# Patient Record
Sex: FEMALE | Race: WHITE | NOT HISPANIC OR LATINO | Employment: FULL TIME | URBAN - METROPOLITAN AREA
[De-identification: names, ages, dates, MRNs, and addresses within clinical notes are randomized per-mention and may not be internally consistent; named-entity substitution may affect disease eponyms.]

---

## 2017-01-04 ENCOUNTER — ALLSCRIPTS OFFICE VISIT (OUTPATIENT)
Dept: OTHER | Facility: OTHER | Age: 27
End: 2017-01-04

## 2017-02-17 LAB
GLUCOSE 1H P GLC SERPL-MCNC: 176 MG/DL
GLUCOSE P FAST SERPL-MCNC: 75 MG/DL

## 2017-05-11 ENCOUNTER — ALLSCRIPTS OFFICE VISIT (OUTPATIENT)
Dept: OTHER | Facility: OTHER | Age: 27
End: 2017-05-11

## 2017-08-24 ENCOUNTER — ALLSCRIPTS OFFICE VISIT (OUTPATIENT)
Dept: OTHER | Facility: OTHER | Age: 27
End: 2017-08-24

## 2017-08-25 ENCOUNTER — GENERIC CONVERSION - ENCOUNTER (OUTPATIENT)
Dept: OTHER | Facility: OTHER | Age: 27
End: 2017-08-25

## 2017-11-29 ENCOUNTER — ALLSCRIPTS OFFICE VISIT (OUTPATIENT)
Dept: OTHER | Facility: OTHER | Age: 27
End: 2017-11-29

## 2017-12-23 LAB
EXTERNAL ABO GROUPING: NORMAL
EXTERNAL ANTIBODY SCREEN: NORMAL
EXTERNAL CHLAMYDIA SCREEN: NOT DETECTED
EXTERNAL GONORRHEA SCREEN: NOT DETECTED
EXTERNAL HEPATITIS B SURFACE ANTIGEN: NORMAL
EXTERNAL HIV-1 ANTIBODY: NORMAL
EXTERNAL RH FACTOR: POSITIVE
EXTERNAL RUBELLA IGG QUANTITATION: NORMAL
EXTERNAL SYPHILIS RPR SCREEN: NORMAL

## 2017-12-28 ENCOUNTER — ALLSCRIPTS OFFICE VISIT (OUTPATIENT)
Dept: OTHER | Facility: OTHER | Age: 27
End: 2017-12-28

## 2018-01-13 NOTE — PSYCH
Psych Med Mgmt    Appearance: was calm and cooperative  Observed mood: mood appropriate  Observed mood: affect appropriate  Speech: a normal rate  Hallucinations: no hallucinations present  Thought Content: no delusions  Treatment Recommendations: Seroquel 100 mg po qhs   Lexapro 10 mg po qd    Discussed possibility of reducing seroquel to 75 mg at next visit if stable    Pt on birth control- no plans of pregnancy in near future  Risks, Benefits And Possible Side Effects Of Medications: Risks, benefits, and possible side effects of medications explained to patient and patient verbalizes understanding, Risks of medications explained if female patient  Patient verbalizes understanding and agrees to notify her doctor if she becomes pregnant  She reports normal appetite, normal energy level, no weight change and normal number of sleep hours  pt seen for follow up OCD  Pt states she has been feeling "pretty good"  Her son is now 17 months old  He is doing well- she is no longer fearful of spending time with him alone  Sleeping about 7-8 hours at night  No panic attacks- no nightmares  Work has been stable  Appetite is good  She is going to [place] Sunday  She has some anxiety about the plane ride but otherwise stable  Discussed residual anxiety  She reports good interest and motivation  Vitals  Signs [Data Includes: Current Encounter]   Recorded: [id number] 04:23PM   Heart Rate: 79  Respiration: 16  Systolic: 876  Diastolic: 77  Height: 5 ft 8 in  Weight: 165 lb   BMI Calculated: 25 09  BSA Calculated: 1 88  Recorded: 91EHC0298 04:22PM   Respiration: 16  Height: 5 ft 7 in    DSM    Provisional Diagnosis: OCD  Anxiety with panic  Assessment    1  OCD (obsessive compulsive disorder) (300 3) (F42)   2  Generalized anxiety disorder (300 02) (F41 1)    Plan    1  Escitalopram Oxalate 10 MG Oral Tablet; 1  tab po qd    2   QUEtiapine Fumarate 100 MG Oral Tablet (SEROquel); 1 po qhs    Review of Systems    Constitutional: No fever, no chills, feels well, no tiredness, no recent weight gain or loss  Cardiovascular: no complaints of slow or fast heart rate, no chest pain, no palpitations  Respiratory: no complaints of shortness of breath, no wheezing, no dyspnea on exertion  Gastrointestinal: no complaints of abdominal pain, no constipation, no nausea, no diarrhea, no vomiting  Genitourinary: no complaints of dysuria, no incontinence, no pelvic pain, no urinary frequency  Musculoskeletal: no complaints of arthralgia, no myalgias, no limb pain, no joint stiffness  Integumentary: no complaints of skin rash, no itching, no dry skin  Neurological: no complaints of headache, no confusion, no numbness, no dizziness  Active Problems    1  Generalized anxiety disorder (300 02) (F41 1)   2  OCD (obsessive compulsive disorder) (300 3) (F42)    Past Medical History    The active problems and past medical history were reviewed and updated today  Allergies    1  Sulfa Drugs    Current Meds   1  Escitalopram Oxalate 10 MG Oral Tablet; 1  tab po qd; Therapy: 04HMQ6410 to (Last Rx:30Mar2016)  Requested for: 43BKI5925 Ordered   2  QUEtiapine Fumarate 100 MG Oral Tablet; 1 po qhs;   Therapy: 20Cdj5899 to (Last Rx:30Mar2016)  Requested for: 48ZQE2798 Ordered   3  Zovia 1-35 MG-MCG TABS; Therapy: (Recorded:24Jun2016) to Recorded    The medication list was reviewed and updated today  Family Psych History  Mother    1  Family history of Anxiety    The family history was reviewed and updated today  Social History    · Never a smoker   · Social alcohol use (Z78 9)  The social history was reviewed and updated today  The social history was reviewed and is unchanged  End of Encounter Meds    1  Escitalopram Oxalate 10 MG Oral Tablet; 1  tab po qd; Therapy: 37OLH4444 to (Last Rx:24Jun2016)  Requested for: 24Jun2016 Ordered    2   QUEtiapine Fumarate 100 MG Oral Tablet (SEROquel); 1 po qhs;   Therapy: 08Odh1303 to (Last Rx:24Jun2016)  Requested for: 82CVW5004 Ordered    3  Zovia 1-35 MG-MCG TABS;    Therapy: (Recorded:24Jun2016) to Recorded    Signatures   Electronically signed by : Isaías Rodrigez, [facility]; Jun 24 2016  4:27PM EST                       (Author)    Electronically signed by : Latonia Valencia MD; Jun 27 2016  3:46PM EST

## 2018-01-14 NOTE — PSYCH
Psych Med Mgmt    Appearance: was calm and cooperative  Observed mood: anxious  Observed mood: affect appropriate  Speech: a normal rate  Thought processes: coherent/organized  Hallucinations: no hallucinations present  Thought Content: no delusions  Abnormal Thoughts: The patient has no suicidal thoughts and no homicidal thoughts  Orientation: The patient is oriented to person, place and time  Recent and Remote Memory: short term memory intact and long term memory intact  Attention Span And Concentration: concentration intact  Insight: Insight intact  Judgment: Her judgment was intact  Muscle Strength And Tone  Normal gait and station  Treatment Recommendations: Continue Seroquel 100 mg qhs-  will try to reduce seroquel in June  Lexapro 10 mg po qd  Risks, Benefits And Possible Side Effects Of Medications: Risks, benefits, and possible side effects of medications explained to patient and patient verbalizes understanding  She reports normal appetite, normal energy level, no weight change and normal number of sleep hours  Pt states she doing better and being alone with Judith Chapman is okay- her anxiety is manageable  She states she knows she will always have some anxiety but feels as though she can mange it  Sleeping well - about 8-9 hours  Appetite good po  Work has been busy  Vitals  Signs [Data Includes: Current Encounter]   Recorded: [id number] 04:31PM   Height: 5 ft 7 in  Weight: 165 lb   BMI Calculated: 25 84  BSA Calculated: 1 86    DSM    Provisional Diagnosis: OCD  ROBINA  Assessment    1  OCD (obsessive compulsive disorder) (300 3) (F42)   2  Generalized anxiety disorder (300 02) (F41 1)    Plan    1  Escitalopram Oxalate 10 MG Oral Tablet; 1  tab po qd    2  QUEtiapine Fumarate 100 MG Oral Tablet (SEROquel); 1 po qhs    Review of Systems    Constitutional: No fever, no chills, feels well, no tiredness, no recent weight gain or loss     Cardiovascular: no complaints of slow or fast heart rate, no chest pain, no palpitations  Respiratory: no complaints of shortness of breath, no wheezing, no dyspnea on exertion  Gastrointestinal: no complaints of abdominal pain, no constipation, no nausea, no diarrhea, no vomiting  Genitourinary: no complaints of dysuria, no incontinence, no pelvic pain, no urinary frequency  Musculoskeletal: no complaints of arthralgia, no myalgias, no limb pain, no joint stiffness  Integumentary: no complaints of skin rash, no itching, no dry skin  Neurological: no complaints of headache, no confusion, no numbness, no dizziness  Active Problems    1  Generalized anxiety disorder (300 02) (F41 1)   2  OCD (obsessive compulsive disorder) (300 3) (F42)    Allergies    1  Sulfa Drugs    Current Meds   1  Escitalopram Oxalate 10 MG Oral Tablet; 1  tab po qd; Therapy: 52SFX2711 to (Last Rx:47Yje2979)  Requested for: 05Ohz2845 Ordered   2  QUEtiapine Fumarate 100 MG Oral Tablet; 1 po qhs;   Therapy: 56Jok4892 to (Last Rx:94Nky4956)  Requested for: 49Vgf6978 Ordered    The medication list was reviewed and updated today  Family Psych History    1  Family history of Anxiety    The family history was reviewed and updated today  Social History    · Never a smoker   · Social alcohol use (Z78 9)  The social history was reviewed and updated today  The social history was reviewed and is unchanged  End of Encounter Meds    1  Escitalopram Oxalate 10 MG Oral Tablet; 1  tab po qd; Therapy: 94WKL1181 to (Last Rx:30Mar2016)  Requested for: 48YUX1598 Ordered    2   QUEtiapine Fumarate 100 MG Oral Tablet (SEROquel); 1 po qhs;   Therapy: 54Rzt1355 to (Last Rx:30Mar2016)  Requested for: 11PEP1389 Ordered    Signatures   Electronically signed by : Eve White [facility]; Mar 30 2016  4:46PM EST                       (Author)    Electronically signed by : Sharon Verduzco MD; Apr 4 2016  5:15PM EST

## 2018-01-15 NOTE — PSYCH
Psych Med Mgmt    Appearance: was calm and cooperative, adequate hygiene and grooming and good eye contact  Observed mood: mood appropriate  Observed mood: affect appropriate  Speech: a normal rate  Thought processes: coherent/organized  Hallucinations: no hallucinations present  Thought Content: no delusions  Abnormal Thoughts: The patient has no suicidal thoughts and no homicidal thoughts  Orientation: The patient is oriented to person, place and time  Recent and Remote Memory: short term memory intact and long term memory intact  Attention Span And Concentration: concentration intact  Insight: Insight intact  Judgment: Her judgment was intact  Muscle Strength And Tone  Normal gait and station  Treatment Recommendations: Lexapro 10 mg po qd  Seroquel 25 mg x 14 nights then 1/2 po qhs  Risks, Benefits And Possible Side Effects Of Medications: Risks, benefits, and possible side effects of medications explained to patient and patient verbalizes understanding  Discussed with patient the risks of sedation, respiratory depression, impairment of ability to drive and potential for abuse and addiction related to treatment with benzodiazepine medications  The patient understands risk of treatment with benzodiazepine medications, agrees to not drive if feels impaired and agrees to take medications as prescribed  She reports normal appetite, normal energy level, no weight change and normal number of sleep hours  Pt seen for follow up OCD/ ROBINA  Pt states initially when she reduced the seroquel she felt "a little edgy"  After a few days she felt okay though and has been doing well  She has not had any panic attacks and reports she has "normal anxiety" and is generally in good spirits  Good interest and motivation She states she sleeps well through the night  Appetite is good but she has gained a few pounds because not exercising as much due to tie constraints    No SI and doing well with her moods  No adverse effects with meds  No new meds or medical issues  Vitals  Signs   Recorded: 24Aug2017 04:27PM   Respiration: 1  Height: 5 ft 8 in  Weight: 190 lb   BMI Calculated: 28 89  BSA Calculated: 2    DSM    Provisional Diagnosis: ROBINA  OCD  Assessment    1  Generalized anxiety disorder (300 02) (F41 1)   2  OCD (obsessive compulsive disorder) (300 3) (F42 9)    Plan    1  Escitalopram Oxalate 10 MG Oral Tablet; take 1 tablet by mouth every day    2  QUEtiapine Fumarate 25 MG Oral Tablet; 1 po qhs    Review of Systems    Constitutional: No fever, no chills, feels well, no tiredness, no recent weight gain or loss  Cardiovascular: no complaints of slow or fast heart rate, no chest pain, no palpitations  Respiratory: no complaints of shortness of breath, no wheezing, no dyspnea on exertion  Gastrointestinal: no complaints of abdominal pain, no constipation, no nausea, no diarrhea, no vomiting  Genitourinary: no complaints of dysuria, no incontinence, no pelvic pain, no urinary frequency  Musculoskeletal: no complaints of arthralgia, no myalgias, no limb pain, no joint stiffness  Integumentary: no complaints of skin rash, no itching, no dry skin  Neurological: no complaints of headache, no confusion, no numbness, no dizziness  Active Problems    1  Generalized anxiety disorder (300 02) (F41 1)   2  OCD (obsessive compulsive disorder) (300 3) (F42 9)    Allergies    1  Sulfa Drugs    Current Meds   1  Escitalopram Oxalate 10 MG Oral Tablet; take 1 tablet by mouth every day; Therapy: 07UAR8393 to (Evaluate:17Rpe0564)  Requested for: 73BQF5375; Last   Rx:41Pdt6588 Ordered   2  QUEtiapine Fumarate 50 MG Oral Tablet; Take 1 and 1/2 po qhs x 14 days then 1 po qhs;   Therapy: 78Qjn5542 to (Last Rx:29Cyt9754)  Requested for: 04MAJ1653 Ordered   3  Zovia 1-35 MG-MCG TABS;    Therapy: (Recorded:24Jun2016) to Recorded    The medication list was reviewed and updated today  Family Psych History  Mother    1  Family history of Anxiety    Social History    · Never a smoker   · Social alcohol use (Z78 9)  The social history was reviewed and is unchanged  End of Encounter Meds    1  Escitalopram Oxalate 10 MG Oral Tablet; take 1 tablet by mouth every day; Therapy: 04EWC5661 to (Evaluate:05Iov9596)  Requested for: 75Onu1003; Last   Rx:06Yez3605 Ordered    2  QUEtiapine Fumarate 25 MG Oral Tablet; 1 po qhs;   Therapy: 91FHA8287 to (Last Rx:51Sbe9527)  Requested for: 69Hgh0644 Ordered    3  Zovia 1-35 MG-MCG TABS; Therapy: (Miracle Cousins) to Recorded    Signatures   Electronically signed by : Aviva White, [facility];  Aug 24 2017  4:28PM EST                       (Author)    Electronically signed by : Shaquille Pool MD; Aug 24 2017  4:41PM EST

## 2018-01-15 NOTE — PSYCH
Psych Med Mgmt    Appearance: was calm and cooperative  Observed mood: mood appropriate  Observed mood: affect appropriate  Speech: a normal rate  Thought processes: coherent/organized  Hallucinations: no hallucinations present  Thought Content: no delusions  Abnormal Thoughts: The patient has no suicidal thoughts and no homicidal thoughts  Orientation: The patient is oriented to person, place and time  Recent and Remote Memory: short term memory intact and long term memory intact  Attention Span And Concentration: concentration intact  Insight: Insight intact  Judgment: Her judgment was intact  Muscle Strength And Tone  Normal gait and station  Treatment Recommendations: Decrease Seroquel to 75 mg po qhs x 14 days then 1po qhs  Lexapro 10 mg po qd  Risks, Benefits And Possible Side Effects Of Medications: Risks, benefits, and possible side effects of medications explained to patient and patient verbalizes understanding  Discussed with patient the risks of sedation, respiratory depression, impairment of ability to drive and potential for abuse and addiction related to treatment with benzodiazepine medications  The patient understands risk of treatment with benzodiazepine medications, agrees to not drive if feels impaired and agrees to take medications as prescribed  She reports normal appetite, normal energy level, no weight change and normal number of sleep hours  Pt seen for follow up ROBINA/ OCD  Pt states she is feeling well  Pt overall states anxiety is manageable and no panic attacks  Residual anxiety and racing thoughts but manageable  Pt without depressive episodes  Sleeping well and eating well  Alex Souza is now 2 and doing well  Her anxiety re: baby is much improved  No side effects and interested in taper of seroquel which is reasonable at this point  No new meds or medical issues        Vitals  Signs   Recorded: 95UHW3360 04:21PM   Heart Rate: 85  Systolic: 406  Diastolic: 69  Recorded: 90TWU9100 04:05PM   Height: 5 ft 8 in  Weight: 180 lb   BMI Calculated: 27 37  BSA Calculated: 1 95    DSM    Provisional Diagnosis: ROBINA  OCD  Assessment    1  Generalized anxiety disorder (300 02) (F41 1)   2  OCD (obsessive compulsive disorder) (300 3) (F42 9)    Plan    1  Escitalopram Oxalate 10 MG Oral Tablet; take 1 tablet by mouth every day    2  QUEtiapine Fumarate 50 MG Oral Tablet; Take 1 and 1/2 po qhs x 14 days then 1   po qhs    Review of Systems    Constitutional: No fever, no chills, feels well, no tiredness, no recent weight gain or loss  Cardiovascular: no complaints of slow or fast heart rate, no chest pain, no palpitations  Respiratory: no complaints of shortness of breath, no wheezing, no dyspnea on exertion  Gastrointestinal: no complaints of abdominal pain, no constipation, no nausea, no diarrhea, no vomiting  Genitourinary: no complaints of dysuria, no incontinence, no pelvic pain, no urinary frequency  Musculoskeletal: no complaints of arthralgia, no myalgias, no limb pain, no joint stiffness  Integumentary: no complaints of skin rash, no itching, no dry skin  Neurological: no complaints of headache, no confusion, no numbness, no dizziness  Active Problems    1  Generalized anxiety disorder (300 02) (F41 1)   2  OCD (obsessive compulsive disorder) (300 3) (F42 9)    Allergies    1  Sulfa Drugs    Current Meds   1  Escitalopram Oxalate 10 MG Oral Tablet; take 1 tablet by mouth every day; Therapy: 47BBB9205 to (Ayaka Face)  Requested for: 63MJA7358; Last   SL:52XVF1823 Ordered   2  QUEtiapine Fumarate 100 MG Oral Tablet; Take 1 tablet by mouth at bedtime; Therapy: 10Ofm9915 to (Kelsey Hernandez)  Requested for: 02AMK2804; Last   Rx:04Jan2017 Ordered   3  Zovia 1-35 MG-MCG TABS; Therapy: (Recorded:24Jun2016) to Recorded    The medication list was reviewed and updated today         Family Psych History  Mother 1  Family history of Anxiety    The family history was reviewed and updated today  Social History    · Never a smoker   · Social alcohol use (Z78 9)    End of Encounter Meds    1  Escitalopram Oxalate 10 MG Oral Tablet; take 1 tablet by mouth every day; Therapy: 54FQP2290 to (Evaluate:35Pov9609)  Requested for: 42MSM6720; Last   Rx:71Vrf0863 Ordered    2  QUEtiapine Fumarate 50 MG Oral Tablet; Take 1 and 1/2 po qhs x 14 days then 1 po qhs;   Therapy: 53Zjq7183 to (Last Rx:11May2017)  Requested for: 11DEG3169 Ordered    3  Zovia 1-35 MG-MCG TABS;    Therapy: (Recorded:15Xhf2128) to Recorded    Signatures   Electronically signed by : Annetta Cheek, [facility]; May 11 2017  4:24PM EST                       (Author)    Electronically signed by : Scott Bay MD; May 15 2017  4:51PM EST

## 2018-01-17 NOTE — PSYCH
Psych Med Mgmt    Appearance: was calm and cooperative  Observed mood: mood appropriate  Observed mood: affect appropriate  Speech: a normal rate  Thought processes: coherent/organized  Hallucinations: no hallucinations present  Thought Content: no delusions  Abnormal Thoughts: The patient has no suicidal thoughts and no homicidal thoughts  Orientation: The patient is oriented to person, place and time  Recent and Remote Memory: short term memory intact and long term memory intact  Attention Span And Concentration: concentration intact  Insight: Insight intact  Judgment: Her judgment was intact  Muscle Strength And Tone  Normal gait and station  Treatment Recommendations: Continue Seroquel 100 mg po qhs  Lexapro 10 mg po qd  She reports normal appetite, normal energy level, no weight change and normal number of sleep hours  Pt states she has been doing well- she is managing well at home  She has not been having any panic attacks  She had good trip to [place] and no anxiety on plane  She is sleeping well about 7-8 hours nightly  Appetite is good- no weight change  No side effects with meds  States she feels as though her obsessive thoughts are manageable  She is agreeable to continue with same treatment plan  No medical issues  Work is stable and baby is now a year and a half old  She is no longer fearful of being alone with him  Vitals  Signs   Recorded: 15GGW8169 91:23ZD   Systolic: 662  Diastolic: 77  Heart Rate: 84  Recorded: 81ALT7058 04:45PM   Respiration: 16  Height: 5 ft 8 in  Weight: 170 lb   BMI Calculated: 25 85  BSA Calculated: 1 91    DSM    Provisional Diagnosis: OCD  ROBINA  Assessment    1  Generalized anxiety disorder (300 02) (F41 1)   2  OCD (obsessive compulsive disorder) (300 3) (F42)    Plan    1   Escitalopram Oxalate 10 MG Oral Tablet; 1  tab po qd    Review of Systems    Constitutional: No fever, no chills, feels well, no tiredness, no recent weight gain or loss  Cardiovascular: no complaints of slow or fast heart rate, no chest pain, no palpitations  Respiratory: no complaints of shortness of breath, no wheezing, no dyspnea on exertion  Gastrointestinal: no complaints of abdominal pain, no constipation, no nausea, no diarrhea, no vomiting  Genitourinary: no complaints of dysuria, no incontinence, no pelvic pain, no urinary frequency  Musculoskeletal: no complaints of arthralgia, no myalgias, no limb pain, no joint stiffness  Active Problems    1  Generalized anxiety disorder (300 02) (F41 1)   2  OCD (obsessive compulsive disorder) (300 3) (F42)    Allergies    1  Sulfa Drugs    Current Meds   1  Escitalopram Oxalate 10 MG Oral Tablet; 1  tab po qd; Therapy: 88KGJ9006 to (Last Rx:24Jun2016)  Requested for: 24Jun2016 Ordered   2  QUEtiapine Fumarate 100 MG Oral Tablet; Take 1 tablet by mouth at bedtime; Therapy: 58Cfs4149 to (Evaluate:19Jan2017)  Requested for: 38Yrt8109; Last   Rx:39Ils4992 Ordered   3  Zovia 1-35 MG-MCG TABS; Therapy: (Recorded:24Jun2016) to Recorded    The medication list was reviewed and updated today  Family Psych History  Mother    1  Family history of Anxiety    The family history was reviewed and updated today  Social History    · Never a smoker   · Social alcohol use (Z78 9)  The social history was reviewed and updated today  The social history was reviewed and is unchanged  End of Encounter Meds    1  Escitalopram Oxalate 10 MG Oral Tablet; 1  tab po qd; Therapy: 65AZF4123 to (Last Rx:28Sep2016)  Requested for: 28Sep2016 Ordered    2  QUEtiapine Fumarate 100 MG Oral Tablet (SEROquel); Take 1 tablet by mouth at   bedtime; Therapy: 77Lbt4846 to (Evaluate:19Jan2017)  Requested for: 50Hmf5730; Last   Rx:98Ilh2872 Ordered    3  Zovia 1-35 MG-MCG TABS;    Therapy: (Recorded:24Jun2016) to Recorded    Signatures   Electronically signed by : Pito Alvarado; Sep 28 2016  5:05PM EST (Author)    Electronically signed by : Ida Reddy MD; Oct  3 2016  4:08PM EST

## 2018-01-17 NOTE — PSYCH
Psych Med Mgmt    Appearance: was calm and cooperative  Observed mood: mood appropriate  Observed mood: affect appropriate  Speech: a normal rate  Thought processes: coherent/organized  Hallucinations: no hallucinations present  Thought Content: no delusions  Abnormal Thoughts: The patient has no suicidal thoughts and no homicidal thoughts  Orientation: The patient is oriented to person, place and time  Recent and Remote Memory: short term memory intact and long term memory intact  Attention Span And Concentration: concentration intact  Insight: Insight intact  Judgment: Her judgment was intact  Muscle Strength And Tone  Normal gait and station  Treatment Recommendations: Decrease seroquel to 25 mg po qhs  Continue Lexapro 10 mg po qd    Follow up in one month and will try to d/c seroquel if possible    She has appt with OB next week and will speak to them as well about meds  Risks, Benefits And Possible Side Effects Of Medications: Risks, benefits, and possible side effects of medications explained to patient and patient verbalizes understanding, Risks of medications explained if female patient  Patient verbalizes understanding and agrees to notify her doctor if she becomes pregnant  She reports normal appetite, normal energy level, no weight change and normal number of sleep hours  Pt seen for follow up OCD/ ROBINA  Pt states she is pregnant and took a test but has not seen doctor yet  She is going for blood work tomorrow seen by   [facility]  She spoke to her OB and told her not to stop any meds except for her birth control  Discussed pros/cons of meds during pregnancy  She was on zoloft during her first pregnancy and stopped at the end of pregnancy and had severe anxiety and post partum depression  Moods have been good with current meds and OCD stable  She did try to reduce seroquel to 25 mg in August but had increased anxiety so back on 50mg     Sleep is fair and good appetite  No SI  She is happy about pregnancy  DSM    Provisional Diagnosis: OCD  ROBINA  Assessment    1  OCD (obsessive compulsive disorder) (300 3) (F42 9)   2  Generalized anxiety disorder (300 02) (F41 1)    Plan    1  Escitalopram Oxalate 10 MG Oral Tablet; take 1 tablet by mouth every day    2  QUEtiapine Fumarate 25 MG Oral Tablet; 1 po qhs    Review of Systems    Constitutional: No fever, no chills, feels well, no tiredness, no recent weight gain or loss  Cardiovascular: no complaints of slow or fast heart rate, no chest pain, no palpitations  Respiratory: no complaints of shortness of breath, no wheezing, no dyspnea on exertion  Gastrointestinal: no complaints of abdominal pain, no constipation, no nausea, no diarrhea, no vomiting  Genitourinary: no complaints of dysuria, no incontinence, no pelvic pain, no urinary frequency  Musculoskeletal: no complaints of arthralgia, no myalgias, no limb pain, no joint stiffness  Integumentary: no complaints of skin rash, no itching, no dry skin  Neurological: no complaints of headache, no confusion, no numbness, no dizziness  Active Problems    1  Generalized anxiety disorder (300 02) (F41 1)   2  OCD (obsessive compulsive disorder) (300 3) (F42 9)    Allergies    1  Sulfa Drugs    Current Meds   1  Escitalopram Oxalate 10 MG Oral Tablet; take 1 tablet by mouth every day; Therapy: 55LUC0516 to (Evaluate:22Jzi1881)  Requested for: 49Bvt5252; Last   Rx:86Wmk3983 Ordered   2  QUEtiapine Fumarate 50 MG Oral Tablet; 1 po qhs;   Therapy: 92Def9402 to (Last Rx:91Gqu1465)  Requested for: 86LAY1217 Ordered    Family Psych History  Mother    1  Family history of Anxiety    Social History    · Never a smoker   · Social alcohol use (Z78 9)  The social history was reviewed and is unchanged  End of Encounter Meds    1  Escitalopram Oxalate 10 MG Oral Tablet; take 1 tablet by mouth every day;    Therapy: 53LON0978 to (Evaluate:29Mar2018) Requested for: 78DWV1563; Last   Rx:29Nov2017 Ordered    2   QUEtiapine Fumarate 25 MG Oral Tablet; 1 po qhs;   Therapy: 77Kej3948 to (Last Rx:29Nov2017)  Requested for: 99PJY2856 Ordered    Signatures   Electronically signed by : Susy Connell, [facility]; Nov 29 2017  4:35PM EST                       (Author)    Electronically signed by : Teri Novoa MD; Nov 30 2017  4:48PM EST

## 2018-01-18 NOTE — PSYCH
Psych Med Mgmt    Appearance: was calm and cooperative  Observed mood: anxious  Observed mood: affect appropriate  Speech: a normal rate  Thought processes: coherent/organized  Hallucinations: no hallucinations present  Treatment Recommendations: Continue Seroquel 100 mgpo qhs  Lexapro 10 mg po qd    Will consider reduction in seroquel next visit if remains stable  Risks, Benefits And Possible Side Effects Of Medications: Risks, benefits, and possible side effects of medications explained to patient and patient verbalizes understanding, Risks of medications explained if female patient  Patient verbalizes understanding and agrees to notify her doctor if she becomes pregnant  She reports normal appetite, normal energy level, no weight change and normal number of sleep hours  Pt seen for follow up OCD/ Depression  Pt states her anxiety has been manageable  Pt overall doing well  Sleeping well about 8 hours most night  Appetite is good  Good relationship with significant other and baby Sury Crutch  No panic attacks or significant depressive episodes  Work has been stable  Nonew meds or medical issues  Vitals  Signs   Recorded: 42URJ6512 04:51PM   Heart Rate: 81  Systolic: 114  Diastolic: 74  Recorded: 24UPL8179 04:50PM   Respiration: 16  Height: 5 ft 8 in  Weight: 170 lb   BMI Calculated: 25 85  BSA Calculated: 1 91    DSM    Provisional Diagnosis: OCD  MDD  Assessment    1  OCD (obsessive compulsive disorder) (300 3) (F42 9)   2  Generalized anxiety disorder (300 02) (F41 1)    Plan    1  Escitalopram Oxalate 10 MG Oral Tablet; 1  tab po qd    2  QUEtiapine Fumarate 100 MG Oral Tablet (SEROquel); Take 1 tablet by mouth at   bedtime    Review of Systems    Constitutional: No fever, no chills, feels well, no tiredness, no recent weight gain or loss  Cardiovascular: no complaints of slow or fast heart rate, no chest pain, no palpitations     Respiratory: no complaints of shortness of breath, no wheezing, no dyspnea on exertion  Gastrointestinal: no complaints of abdominal pain, no constipation, no nausea, no diarrhea, no vomiting  Genitourinary: no complaints of dysuria, no incontinence, no pelvic pain, no urinary frequency  Musculoskeletal: no complaints of arthralgia, no myalgias, no limb pain, no joint stiffness  Integumentary: no complaints of skin rash, no itching, no dry skin  Neurological: no complaints of headache, no confusion, no numbness, no dizziness  Active Problems    1  Generalized anxiety disorder (300 02) (F41 1)   2  OCD (obsessive compulsive disorder) (300 3) (F42 9)    Allergies    1  Sulfa Drugs    Current Meds   1  Escitalopram Oxalate 10 MG Oral Tablet; 1  tab po qd; Therapy: 66ZHQ7716 to (Last Rx:66Gys9924)  Requested for: 43Vzp6598 Ordered   2  QUEtiapine Fumarate 100 MG Oral Tablet; Take 1 tablet by mouth at bedtime; Therapy: 76Xov9820 to (Evaluate:19Jan2017)  Requested for: 88Qvy7405; Last   Rx:88Iuq7486 Ordered   3  Zovia 1-35 MG-MCG TABS; Therapy: (Recorded:24Jun2016) to Recorded    The medication list was reviewed and updated today  Family Psych History  Mother    1  Family history of Anxiety    The family history was reviewed and updated today  Social History    · Never a smoker   · Social alcohol use (Z78 9)  The social history was reviewed and updated today  The social history was reviewed and is unchanged  End of Encounter Meds    1  Escitalopram Oxalate 10 MG Oral Tablet; 1  tab po qd; Therapy: 98ZOL7319 to (Last MN:57YOG9035)  Requested for: 32SZX8646 Ordered    2  QUEtiapine Fumarate 100 MG Oral Tablet (SEROquel); Take 1 tablet by mouth at   bedtime; Therapy: 86Icy9661 to (Mina Henry)  Requested for: 59PRS6602; Last   Rx:04Jan2017 Ordered    3  Zovia 1-35 MG-MCG TABS;    Therapy: (Recorded:24Jun2016) to Recorded    Signatures   Electronically signed by : Tila Castillo, [facility]; Jan 4 2017  5:01PM EST (Author)    Electronically signed by : Faith Rosas MD; Jan 9 2017  2:46PM EST

## 2018-01-18 NOTE — MISCELLANEOUS
Message   Recorded as Task   Date: 09/19/2017 12:57 PM, Created By: Rosina Godinez   Task Name: Med Renewal Request   Assigned To: Aleshia Serrano   Regarding Patient: Kiara Lira, Status: Active   CommentGweduardo Bali - 19 Sep 2017 12:57 PM     TASK CREATED  Caller: Self; Renew Medication; [phone] (Home)  pt called in regards to a Med change [phone]   Pt reports she has not been sleeping well with reduction in seroquel and feeling more anxious  Would like to go back to 50 mg po qhs which we will do until she is seen next        Plan  OCD (obsessive compulsive disorder)    · QUEtiapine Fumarate 50 MG Oral Tablet; 1 po qhs    Signatures   Electronically signed by : MAREN Miller; Sep 20 2017 12:06PM EST                       (Author)

## 2018-01-22 VITALS
BODY MASS INDEX: 31.52 KG/M2 | HEIGHT: 68 IN | WEIGHT: 208 LBS | DIASTOLIC BLOOD PRESSURE: 73 MMHG | HEART RATE: 91 BPM | RESPIRATION RATE: 16 BRPM | SYSTOLIC BLOOD PRESSURE: 132 MMHG

## 2018-01-22 VITALS
BODY MASS INDEX: 27.28 KG/M2 | HEIGHT: 68 IN | SYSTOLIC BLOOD PRESSURE: 121 MMHG | WEIGHT: 180 LBS | HEART RATE: 85 BPM | DIASTOLIC BLOOD PRESSURE: 69 MMHG

## 2018-01-22 VITALS — WEIGHT: 190 LBS | BODY MASS INDEX: 28.79 KG/M2 | RESPIRATION RATE: 1 BRPM | HEIGHT: 68 IN

## 2018-01-22 VITALS
RESPIRATION RATE: 16 BRPM | BODY MASS INDEX: 25.76 KG/M2 | HEART RATE: 81 BPM | HEIGHT: 68 IN | SYSTOLIC BLOOD PRESSURE: 123 MMHG | DIASTOLIC BLOOD PRESSURE: 74 MMHG | WEIGHT: 170 LBS

## 2018-01-23 NOTE — PSYCH
Psych Med Mgmt    Appearance: was calm and cooperative and good eye contact  Observed mood: mood appropriate  Observed mood: affect appropriate  Speech: a normal rate  Thought processes: coherent/organized  Hallucinations: no hallucinations present  Thought Content: no delusions  Abnormal Thoughts: The patient has no suicidal thoughts and no homicidal thoughts  Orientation: The patient is oriented to person, place and time  Recent and Remote Memory: short term memory intact and long term memory intact  Attention Span And Concentration: concentration intact  Insight: Insight intact  Judgment: Her judgment was intact  Treatment Recommendations: Lexapro 10 mg po qd  Seroquel 25 mg po qhs- decrease 12 5 mg po qhs  Follow up 1 month  Pt states her OB/GYN is aware that she is on lexapro and seroquel  Risks, Benefits And Possible Side Effects Of Medications: Risks, benefits, and possible side effects of medications explained to patient and patient verbalizes understanding  She reports normal appetite, normal energy level, no weight change and normal number of sleep hours  Pt seen for follow up ROBINA/ OCD  Pt states she is feeling more tired and nauseated with being in first trimester  13 weeks pregnant now and she is feeling well  No issues with anxiety attacks or panic attacks  States some more difficulty falling asleep but is getting enough rest   Son Sury Abarca is doing well  Good relationship with spouse  Work has been stable  Vitals  Signs   Recorded: 60Dmw0909 04:59PM   Heart Rate: 91  Systolic: 064  Diastolic: 73  Recorded: 36XDS8943 04:47PM   Respiration: 16  Height: 5 ft 8 in  Weight: 208 lb   BMI Calculated: 31 63  BSA Calculated: 2 08    DSM    Provisional Diagnosis: ROBINA  OCD  Assessment    1  OCD (obsessive compulsive disorder) (300 3) (F42 9)   2   Generalized anxiety disorder (300 02) (F41 1)    Review of Systems    Constitutional: No fever, no chills, feels well, no tiredness, no recent weight gain or loss  Cardiovascular: no complaints of slow or fast heart rate, no chest pain, no palpitations  Respiratory: no complaints of shortness of breath, no wheezing, no dyspnea on exertion  Gastrointestinal: no complaints of abdominal pain, no constipation, no nausea, no diarrhea, no vomiting  Genitourinary: no complaints of dysuria, no incontinence, no pelvic pain, no urinary frequency  Musculoskeletal: no complaints of arthralgia, no myalgias, no limb pain, no joint stiffness  Integumentary: no complaints of skin rash, no itching, no dry skin  Neurological: no complaints of headache, no confusion, no numbness, no dizziness  Active Problems    1  Generalized anxiety disorder (300 02) (F41 1)   2  OCD (obsessive compulsive disorder) (300 3) (F42 9)    Allergies    1  Sulfa Drugs    Current Meds   1  Escitalopram Oxalate 10 MG Oral Tablet; take 1 tablet by mouth every day; Therapy: 31TBY7516 to (Evaluate:29Mar2018)  Requested for: 15AHF8713; Last   Rx:29Nov2017 Ordered   2  QUEtiapine Fumarate 25 MG Oral Tablet; 1 po qhs;   Therapy: 86Kir7460 to (Last Rx:29Nov2017)  Requested for: 29Nov2017 Ordered    The medication list was reviewed and updated today  Family Psych History  Mother    1  Family history of Anxiety    Social History    · Never a smoker   · Social alcohol use (Z78 9)  The social history was reviewed and is unchanged  End of Encounter Meds    1  Escitalopram Oxalate 10 MG Oral Tablet; take 1 tablet by mouth every day; Therapy: 82CDP1691 to (Evaluate:29Mar2018)  Requested for: 55AVV1533; Last   Rx:29Nov2017 Ordered    2   QUEtiapine Fumarate 25 MG Oral Tablet; 1 po qhs;   Therapy: 76Zug7717 to (Last Rx:29Nov2017)  Requested for: 57HNM6722 Ordered    Signatures   Electronically signed by : Kyung Monroy [facility]; Dec 28 2017  5:06PM EST                       (Author)    Electronically signed by : Megan Fulton MD; Jan 2 2018  3:21PM EST

## 2018-02-17 LAB
GLUCOSE 1H P GLC SERPL-MCNC: 176 MG/DL
GLUCOSE 2H P 75 G GLC PO SERPL-MCNC: 136 MG/DL
GLUCOSE 3H P 100 G GLC PO SERPL-MCNC: 42 MG/DL
GLUCOSE P FAST SERPL-MCNC: 75 MG/DL

## 2018-02-22 ENCOUNTER — OFFICE VISIT (OUTPATIENT)
Dept: PSYCHIATRY | Facility: CLINIC | Age: 28
End: 2018-02-22
Payer: COMMERCIAL

## 2018-02-22 DIAGNOSIS — F42.2 MIXED OBSESSIONAL THOUGHTS AND ACTS: Primary | ICD-10-CM

## 2018-02-22 DIAGNOSIS — F41.1 GENERALIZED ANXIETY DISORDER: ICD-10-CM

## 2018-02-22 PROCEDURE — 99214 OFFICE O/P EST MOD 30 MIN: CPT | Performed by: PHYSICIAN ASSISTANT

## 2018-02-22 RX ORDER — ESCITALOPRAM OXALATE 10 MG/1
1 TABLET ORAL DAILY
COMMUNITY
Start: 2015-08-05 | End: 2018-05-07 | Stop reason: SDUPTHER

## 2018-02-22 NOTE — PSYCH
PROGRESS NOTE        [address] [facility]      Name and Date of Birth:  Sampson Paget 29 y o  1990    Date of Visit: 02/22/18    SUBJECTIVE:  Pt seen for follow up Anxiety  Pt states she is having anxiety but it is manageable  Her anxiety subsides during the day  Sleep is fair  She is feeling sick/nauseated/ dizziness with pregnancy  She does not have gestational diabetes  She denies suicidal ideation, intent or plan at present, has no suicidal ideation, intent or plan at present  She denies any auditory hallucinations and visual hallucinations, denies any other delusional thinking, denies any delusional thinking  She denies any side effects from medications    HPI ROS Appetite Changes and Sleep: normal appetite, normal sleep    Review Of Systems:      Constitutional Negative   ENT Negative   Cardiovascular Negative   Respiratory Negative   Gastrointestinal Negative   Genitourinary Negative   Musculoskeletal Negative   Integumentary Negative   Neurological Negative   Endocrine Negative   Other Symptoms Negative and None       Laboratory Results: No results found for this or any previous visit  Substance Abuse History:    History   Drug use: Unknown       Family Psychiatric History:     No family history on file  The following portions of the patient's history were reviewed and updated as appropriate: past family history, past medical history, past social history, past surgical history and problem list     Social History     Social History    Marital status: Single     Spouse name: N/A    Number of children: N/A    Years of education: N/A     Occupational History    Not on file       Social History Main Topics    Smoking status: Not on file    Smokeless tobacco: Not on file    Alcohol use Not on file    Drug use: Unknown    Sexual activity: Not on file     Other Topics Concern    Not on file     Social History Narrative    No narrative on file     Social History     Social History Narrative    No narrative on file        Social History    None             OBJECTIVE:     Mental Status Evaluation:    Appearance age appropriate, casually dressed   Behavior pleasant, cooperative   Speech normal volume, normal pitch   Mood euthymic   Affect euthymic   Thought Processes logical   Associations intact associations   Thought Content normal   Perceptual Disturbances: none   Abnormal Thoughts  Risk Potential Suicidal ideation - None  Homicidal ideation - None  Potential for aggression - No   Orientation oriented to person, place, time/date and situation   Memory recent and remote memory grossly intact   Cosciousness alert and awake   Attention Span attention span and concentration are age appropriate   Intellect Appears to be of Average Intelligence   Insight age appropriate    Judgement good    Muscle Strength and  Gait muscle strength and tone were normal   Language no difficulty naming common objects   Fund of Knowledge displays adequate knowledge of current events   Pain none   Pain Scale 0       Assessment/Plan:       Diagnoses and all orders for this visit:    Mixed obsessional thoughts and acts    Generalized anxiety disorder    Other orders  -     escitalopram (LEXAPRO) 10 mg tablet; Take 1 tablet by mouth daily          Treatment Recommendations/Precautions:    Continue current medications: D/C Seroquel 12 5 mg po qhs  Authorized her to take on prn basis if necessary        Risks/Benefits      Risks, Benefits And Possible Side Effects Of Medications:    Risks, benefits, and possible side effects of medications explained to patient and patient verbalizes understanding and agreement for treatment      Controlled Medication Discussion:     Not applicable    Psychotherapy Provided:     Individual psychotherapy provided: No

## 2018-05-07 DIAGNOSIS — F41.1 GAD (GENERALIZED ANXIETY DISORDER): Primary | ICD-10-CM

## 2018-05-07 RX ORDER — ESCITALOPRAM OXALATE 10 MG/1
TABLET ORAL
Qty: 30 TABLET | Refills: 3 | Status: SHIPPED | OUTPATIENT
Start: 2018-05-07 | End: 2018-08-30 | Stop reason: SDUPTHER

## 2018-05-24 ENCOUNTER — OFFICE VISIT (OUTPATIENT)
Dept: PSYCHIATRY | Facility: CLINIC | Age: 28
End: 2018-05-24
Payer: COMMERCIAL

## 2018-05-24 DIAGNOSIS — F41.1 GAD (GENERALIZED ANXIETY DISORDER): Primary | ICD-10-CM

## 2018-05-24 DIAGNOSIS — F42.2 MIXED OBSESSIONAL THOUGHTS AND ACTS: ICD-10-CM

## 2018-05-24 PROCEDURE — 99214 OFFICE O/P EST MOD 30 MIN: CPT | Performed by: PHYSICIAN ASSISTANT

## 2018-05-24 NOTE — PSYCH
PROGRESS NOTE        [facility]      Name and Date of Birth:  Jonas Perez 29 y o  1990    Date of Visit: 05/24/18    SUBJECTIVE:  Pt seen for follow up  No issues coming off seroquel  She is sleeping fairly well but some low back pain due to pregnancy  She is due in 7 weeks and states things have been stable thus far  Genetic testing was normal and she is feeling okay  Anxiety has been manageable  Appetite is good and nausea resolved  She denies suicidal ideation, intent or plan at present, has no suicidal ideation, intent or plan at present  She denies any auditory hallucinations and visual hallucinations, denies any other delusional thinking, denies any delusional thinking  She denies any side effects from medications    HPI ROS Appetite Changes and Sleep: normal appetite, normal sleep    Review Of Systems:      Constitutional Negative   ENT Negative   Cardiovascular Negative   Respiratory Negative   Gastrointestinal Negative   Genitourinary Negative   Musculoskeletal Negative   Integumentary Negative   Neurological Negative   Endocrine Negative   Other Symptoms Negative and None       Laboratory Results: No results found for this or any previous visit  Substance Abuse History:    History   Drug use: Unknown       Family Psychiatric History:     No family history on file  The following portions of the patient's history were reviewed and updated as appropriate: past family history, past medical history, past social history, past surgical history and problem list     Social History     Social History    Marital status: Single     Spouse name: N/A    Number of children: N/A    Years of education: N/A     Occupational History    Not on file       Social History Main Topics    Smoking status: Not on file    Smokeless tobacco: Not on file    Alcohol use Not on file    Drug use: Unknown    Sexual activity: Not on file Other Topics Concern    Not on file     Social History Narrative    No narrative on file     Social History     Social History Narrative    No narrative on file        Social History    None             OBJECTIVE:     Mental Status Evaluation:    Appearance age appropriate, casually dressed   Behavior pleasant, cooperative   Speech normal volume, normal pitch   Mood euthymic   Affect pleasant   Thought Processes logical   Associations intact associations   Thought Content normal   Perceptual Disturbances: none   Abnormal Thoughts  Risk Potential Suicidal ideation - None  Homicidal ideation - None  Potential for aggression - No   Orientation oriented to person, place, time/date and situation   Memory recent and remote memory grossly intact   Cosciousness alert and awake   Attention Span attention span and concentration are age appropriate   Intellect Appears to be of Average Intelligence   Insight age appropriate    Judgement good    Muscle Strength and  Gait muscle strength and tone were normal   Language no difficulty naming common objects   Fund of Knowledge displays adequate knowledge of current events   Pain none   Pain Scale 0       Assessment/Plan:       Diagnoses and all orders for this visit:    ROBINA (generalized anxiety disorder)    Mixed obsessional thoughts and acts          Treatment Recommendations/Precautions:  Continue with Lexapro 10 mg daily  Pt does not want to try further reduction before delivery  She has history of postpartum so do not think discontinuation of lexapro is in her best interest  Will follow up 2 weeks after delivery    Continue current medications:    Risks/Benefits      Risks, Benefits And Possible Side Effects Of Medications:    Risks, benefits, and possible side effects of medications explained to patient and patient verbalizes understanding and agreement for treatment      Controlled Medication Discussion:     Not applicable    Psychotherapy Provided:     Individual psychotherapy provided: No

## 2018-06-13 PROCEDURE — 87653 STREP B DNA AMP PROBE: CPT | Performed by: OBSTETRICS & GYNECOLOGY

## 2018-06-14 ENCOUNTER — LAB REQUISITION (OUTPATIENT)
Dept: LAB | Facility: HOSPITAL | Age: 28
End: 2018-06-14
Payer: COMMERCIAL

## 2018-06-14 DIAGNOSIS — O09.893 SUPERVISION OF OTHER HIGH RISK PREGNANCIES, THIRD TRIMESTER: ICD-10-CM

## 2018-06-16 LAB — GP B STREP DNA SPEC QL NAA+PROBE: NORMAL

## 2018-07-01 ENCOUNTER — ANESTHESIA EVENT (INPATIENT)
Dept: LABOR AND DELIVERY | Facility: HOSPITAL | Age: 28
End: 2018-07-01
Payer: COMMERCIAL

## 2018-07-01 ENCOUNTER — HOSPITAL ENCOUNTER (INPATIENT)
Facility: HOSPITAL | Age: 28
LOS: 2 days | Discharge: HOME | End: 2018-07-03
Attending: OBSTETRICS & GYNECOLOGY | Admitting: OBSTETRICS & GYNECOLOGY
Payer: COMMERCIAL

## 2018-07-01 ENCOUNTER — ANESTHESIA (INPATIENT)
Dept: LABOR AND DELIVERY | Facility: HOSPITAL | Age: 28
End: 2018-07-01
Payer: COMMERCIAL

## 2018-07-01 PROBLEM — Z3A.39 39 WEEKS GESTATION OF PREGNANCY: Status: ACTIVE | Noted: 2018-07-01

## 2018-07-01 PROBLEM — O24.410 GDM, CLASS A1: Status: ACTIVE | Noted: 2018-07-01

## 2018-07-01 LAB
ABO GROUP BLD: NORMAL
BASE EXCESS BLDCOV CALC-SCNC: 1.7 MMOL/L (ref 1–9)
BASOPHILS # BLD AUTO: 0.01 THOUSANDS/ΜL (ref 0–0.1)
BASOPHILS NFR BLD AUTO: 0 % (ref 0–1)
BLD GP AB SCN SERPL QL: NEGATIVE
EOSINOPHIL # BLD AUTO: 0.09 THOUSAND/ΜL (ref 0–0.61)
EOSINOPHIL NFR BLD AUTO: 1 % (ref 0–6)
ERYTHROCYTE [DISTWIDTH] IN BLOOD BY AUTOMATED COUNT: 15 % (ref 11.6–15.1)
GLUCOSE SERPL-MCNC: 95 MG/DL (ref 65–140)
HCO3 BLDCOV-SCNC: 27.8 MMOL/L (ref 12.2–28.6)
HCT VFR BLD AUTO: 38.9 % (ref 34.8–46.1)
HGB BLD-MCNC: 13.2 G/DL (ref 11.5–15.4)
LYMPHOCYTES # BLD AUTO: 1.25 THOUSANDS/ΜL (ref 0.6–4.47)
LYMPHOCYTES NFR BLD AUTO: 14 % (ref 14–44)
MCH RBC QN AUTO: 30.9 PG (ref 26.8–34.3)
MCHC RBC AUTO-ENTMCNC: 33.9 G/DL (ref 31.4–37.4)
MCV RBC AUTO: 91 FL (ref 82–98)
MONOCYTES # BLD AUTO: 0.84 THOUSAND/ΜL (ref 0.17–1.22)
MONOCYTES NFR BLD AUTO: 9 % (ref 4–12)
NEUTROPHILS # BLD AUTO: 6.84 THOUSANDS/ΜL (ref 1.85–7.62)
NEUTS SEG NFR BLD AUTO: 76 % (ref 43–75)
NRBC BLD AUTO-RTO: 0 /100 WBCS
OXYHGB MFR BLDCOV: 51.6 %
PCO2 BLDCOV: 48.6 MM HG (ref 27–43)
PH BLDCOV: 7.38 [PH] (ref 7.19–7.49)
PLATELET # BLD AUTO: 209 THOUSANDS/UL (ref 149–390)
PMV BLD AUTO: 11.9 FL (ref 8.9–12.7)
PO2 BLDCOV: 23.4 MM HG (ref 15–45)
RBC # BLD AUTO: 4.27 MILLION/UL (ref 3.81–5.12)
RH BLD: POSITIVE
SAO2 % BLDCOV: 12.1 ML/DL
SPECIMEN EXPIRATION DATE: NORMAL
WBC # BLD AUTO: 9.03 THOUSAND/UL (ref 4.31–10.16)

## 2018-07-01 PROCEDURE — 86900 BLOOD TYPING SEROLOGIC ABO: CPT | Performed by: OBSTETRICS & GYNECOLOGY

## 2018-07-01 PROCEDURE — G0463 HOSPITAL OUTPT CLINIC VISIT: HCPCS

## 2018-07-01 PROCEDURE — 86592 SYPHILIS TEST NON-TREP QUAL: CPT | Performed by: OBSTETRICS & GYNECOLOGY

## 2018-07-01 PROCEDURE — 86850 RBC ANTIBODY SCREEN: CPT | Performed by: OBSTETRICS & GYNECOLOGY

## 2018-07-01 PROCEDURE — 82805 BLOOD GASES W/O2 SATURATION: CPT | Performed by: OBSTETRICS & GYNECOLOGY

## 2018-07-01 PROCEDURE — 82948 REAGENT STRIP/BLOOD GLUCOSE: CPT

## 2018-07-01 PROCEDURE — 99201 HB OFFICE/OUTPATIENT VISIT NEW (BRIEF): CPT

## 2018-07-01 PROCEDURE — 0KQM0ZZ REPAIR PERINEUM MUSCLE, OPEN APPROACH: ICD-10-PCS | Performed by: OBSTETRICS & GYNECOLOGY

## 2018-07-01 PROCEDURE — 86901 BLOOD TYPING SEROLOGIC RH(D): CPT | Performed by: OBSTETRICS & GYNECOLOGY

## 2018-07-01 PROCEDURE — 85025 COMPLETE CBC W/AUTO DIFF WBC: CPT | Performed by: OBSTETRICS & GYNECOLOGY

## 2018-07-01 RX ORDER — DIPHENHYDRAMINE HCL 25 MG
25 TABLET ORAL EVERY 6 HOURS PRN
Status: DISCONTINUED | OUTPATIENT
Start: 2018-07-01 | End: 2018-07-03 | Stop reason: HOSPADM

## 2018-07-01 RX ORDER — CALCIUM CARBONATE 200(500)MG
1000 TABLET,CHEWABLE ORAL DAILY PRN
Status: DISCONTINUED | OUTPATIENT
Start: 2018-07-01 | End: 2018-07-03 | Stop reason: HOSPADM

## 2018-07-01 RX ORDER — OXYCODONE HYDROCHLORIDE AND ACETAMINOPHEN 5; 325 MG/1; MG/1
2 TABLET ORAL EVERY 4 HOURS PRN
Status: DISCONTINUED | OUTPATIENT
Start: 2018-07-01 | End: 2018-07-03 | Stop reason: HOSPADM

## 2018-07-01 RX ORDER — IBUPROFEN 600 MG/1
600 TABLET ORAL EVERY 6 HOURS PRN
Status: DISCONTINUED | OUTPATIENT
Start: 2018-07-01 | End: 2018-07-03 | Stop reason: HOSPADM

## 2018-07-01 RX ORDER — OXYTOCIN/RINGER'S LACTATE 30/500 ML
250 PLASTIC BAG, INJECTION (ML) INTRAVENOUS
Status: DISCONTINUED | OUTPATIENT
Start: 2018-07-01 | End: 2018-07-03 | Stop reason: HOSPADM

## 2018-07-01 RX ORDER — ESCITALOPRAM OXALATE 10 MG/1
10 TABLET ORAL
Status: DISCONTINUED | OUTPATIENT
Start: 2018-07-01 | End: 2018-07-03 | Stop reason: HOSPADM

## 2018-07-01 RX ORDER — OXYTOCIN/RINGER'S LACTATE 30/500 ML
PLASTIC BAG, INJECTION (ML) INTRAVENOUS
Status: COMPLETED
Start: 2018-07-01 | End: 2018-07-01

## 2018-07-01 RX ORDER — DOCUSATE SODIUM 100 MG/1
100 CAPSULE, LIQUID FILLED ORAL 2 TIMES DAILY
Status: DISCONTINUED | OUTPATIENT
Start: 2018-07-01 | End: 2018-07-03 | Stop reason: HOSPADM

## 2018-07-01 RX ORDER — DIAPER,BRIEF,INFANT-TODD,DISP
1 EACH MISCELLANEOUS AS NEEDED
Status: DISCONTINUED | OUTPATIENT
Start: 2018-07-01 | End: 2018-07-03 | Stop reason: HOSPADM

## 2018-07-01 RX ORDER — ONDANSETRON 2 MG/ML
4 INJECTION INTRAMUSCULAR; INTRAVENOUS EVERY 8 HOURS PRN
Status: DISCONTINUED | OUTPATIENT
Start: 2018-07-01 | End: 2018-07-03 | Stop reason: HOSPADM

## 2018-07-01 RX ORDER — ACETAMINOPHEN 325 MG/1
650 TABLET ORAL EVERY 6 HOURS PRN
Status: DISCONTINUED | OUTPATIENT
Start: 2018-07-01 | End: 2018-07-03 | Stop reason: HOSPADM

## 2018-07-01 RX ORDER — ROPIVACAINE HYDROCHLORIDE 2 MG/ML
INJECTION, SOLUTION EPIDURAL; INFILTRATION; PERINEURAL AS NEEDED
Status: DISCONTINUED | OUTPATIENT
Start: 2018-07-01 | End: 2018-07-01 | Stop reason: SURG

## 2018-07-01 RX ORDER — ROPIVACAINE HYDROCHLORIDE 2 MG/ML
INJECTION, SOLUTION EPIDURAL; INFILTRATION; PERINEURAL
Status: COMPLETED
Start: 2018-07-01 | End: 2018-07-01

## 2018-07-01 RX ORDER — OXYCODONE HYDROCHLORIDE AND ACETAMINOPHEN 5; 325 MG/1; MG/1
1 TABLET ORAL EVERY 4 HOURS PRN
Status: DISCONTINUED | OUTPATIENT
Start: 2018-07-01 | End: 2018-07-03 | Stop reason: HOSPADM

## 2018-07-01 RX ORDER — ESCITALOPRAM OXALATE 10 MG/1
10 TABLET ORAL DAILY
Status: DISCONTINUED | OUTPATIENT
Start: 2018-07-01 | End: 2018-07-01

## 2018-07-01 RX ORDER — SODIUM CHLORIDE, SODIUM LACTATE, POTASSIUM CHLORIDE, CALCIUM CHLORIDE 600; 310; 30; 20 MG/100ML; MG/100ML; MG/100ML; MG/100ML
125 INJECTION, SOLUTION INTRAVENOUS CONTINUOUS
Status: DISCONTINUED | OUTPATIENT
Start: 2018-07-01 | End: 2018-07-01

## 2018-07-01 RX ADMIN — Medication 30 UNITS: at 09:10

## 2018-07-01 RX ADMIN — WITCH HAZEL 1 PAD: 500 SOLUTION RECTAL; TOPICAL at 13:00

## 2018-07-01 RX ADMIN — SODIUM CHLORIDE, SODIUM LACTATE, POTASSIUM CHLORIDE, AND CALCIUM CHLORIDE 125 ML/HR: .6; .31; .03; .02 INJECTION, SOLUTION INTRAVENOUS at 08:10

## 2018-07-01 RX ADMIN — DOCUSATE SODIUM 100 MG: 100 CAPSULE, LIQUID FILLED ORAL at 17:48

## 2018-07-01 RX ADMIN — ROPIVACAINE HYDROCHLORIDE 5 ML: 2 INJECTION, SOLUTION EPIDURAL; INFILTRATION at 07:33

## 2018-07-01 RX ADMIN — ESCITALOPRAM OXALATE 10 MG: 10 TABLET ORAL at 22:18

## 2018-07-01 RX ADMIN — IBUPROFEN 600 MG: 600 TABLET ORAL at 13:08

## 2018-07-01 RX ADMIN — BENZOCAINE AND LEVOMENTHOL: 200; 5 SPRAY TOPICAL at 13:00

## 2018-07-01 RX ADMIN — ROPIVACAINE HYDROCHLORIDE 5 ML: 2 INJECTION, SOLUTION EPIDURAL; INFILTRATION at 07:34

## 2018-07-01 RX ADMIN — DOCUSATE SODIUM 100 MG: 100 CAPSULE, LIQUID FILLED ORAL at 11:48

## 2018-07-01 NOTE — DISCHARGE SUMMARY
Discharge Summary - Filipe Gipson 29 y o  female MRN: [id number]    Unit/Bed#: L&D 815-56 Encounter: [id number]    Admission Date: 2018     Discharge Date: 2018    Admitting Diagnosis: IUP at 39 5 weeks, A1GDM, GBS neg, active labor     Discharge Diagnosis: same     Procedures: spontaneous vaginal delivery    Attending: Suzanne Aguilar DO    Hospital Course: Filipe Gipson is a 29 y o   at 39w5d wks who was initially admitted for active labor   She delivered a viable male  on 2018 at [phone]  Weight 9lbs 2oz via spontaneous vaginal delivery  Apgars were 8 (1 min) and 9 (5 min)   was transferred to  nursery  Patient tolerated the procedure well and was transferred to recovery in stable condition  Her postdelivery course was uncomplicated  Predelivery hemaglobin was 13 2  Her postoperative pain was well controlled with oral analgesics  On day of discharge, she was ambulating and able to reasonably perform all ADLs  She was voiding and had appropriate bowel function  Pain was well controlled  She was discharged home on post-partum day #2 without complications  Patient was instructed to follow up with her OB as an outpatient and was given appropriate warnings to call provider if she develops signs of infection or uncontrolled pain  Complications: none apparent    Condition at discharge: good     Discharge instructions/Information to patient and family:   See after visit summary for information provided to patient and family  Provisions for Follow-Up Care:  See after visit summary for information related to follow-up care and any pertinent home health orders  Disposition: Home    Planned Readmission: No    Discharge Medications: For a complete list of the patient's medications, please refer to her med rec      Reymundo Krueger DO  PGY-2 OB/GYN   7/3/2018 6:35 AM

## 2018-07-01 NOTE — ANESTHESIA PROCEDURE NOTES
Epidural Block    Patient location during procedure: OB  Start time: 7/1/2018 7:33 AM  Reason for block: procedure for pain and at surgeon's request  Staffing  Anesthesiologist: Nelly Sauceda  Performed: anesthesiologist   Preanesthetic Checklist  Completed: patient identified, site marked, surgical consent, pre-op evaluation, timeout performed, IV checked, risks and benefits discussed and monitors and equipment checked  Epidural  Patient position: sitting  Prep: Betadine  Patient monitoring: heart rate and frequent blood pressure checks  Approach: midline  Location: lumbar (1-5)  Injection technique: CADEN air  Needle  Needle type: Tuohy   Needle gauge: 18 G  Catheter type: side hole  Catheter size: 20 G  Test dose: negative  Assessment  Sensory level: [id number] aspiration for CSF, negative aspiration for heme and no paresthesia on injection  patient tolerated the procedure well with no immediate complications

## 2018-07-01 NOTE — DISCHARGE INSTRUCTIONS
Breast Care for the Breast Feeding Mother   WHAT YOU SHOULD KNOW:   Your breasts will go through normal changes while you are breastfeeding  Sometimes breast and nipple problems can develop while you are breastfeeding  Learn about changes that are normal and those that may be a problem  Breast care can help you prevent and manage problems so you and your baby can enjoy the benefits of breastfeeding  AFTER YOU LEAVE:   Breast changes while you are breastfeeding:   · For the first few days after your baby is born, your body makes a small amount of breast milk (colostrum)  Within about 2 to 5 days, your body will begin making mature milk  It may take up to 10 days or longer for mature milk to come in  When your mature milk comes in, your breasts will become full and firm  They may feel tender  · Breastfeeding your baby will decrease the full feeling in your breasts  You may feel a tingly sensation during feedings as milk is released from your breasts  This is called the milk let-down reflex  After 7 or more days, the fullness may feel like it has decreased  Your nipples should look the same as they did before you started breastfeeding  Breasts that feel full before and empty after breastfeeding are signs that breastfeeding is going well  Breast problems that can occur while you are breastfeeding:   · Nipple soreness  may occur when you begin to breastfeed your baby  You may also have nipple soreness if your baby is not latched on to your breast correctly  Correct positioning and latch-on may decrease or stop the pain in your nipples  Work with your caregivers to help your baby latch on correctly  It may also be helpful to place warm, wet compresses on your nipples to help decrease pain  · Plugged milk ducts  may cause painful breast lumps  Plugged ducts may be caused by not emptying your breasts completely during feedings   When your baby pauses during breastfeeding, massage and gently squeeze your breast  Gentle massage may unplug a blocked milk duct  Pump out any milk left in your breasts after your baby is done breastfeeding  Avoid wearing tight tops, tight bras, or under-wire bras, because they may put pressure on your breasts  · Engorgement  may occur as your milk comes in soon after you begin breastfeeding  Engorgement may cause your breasts to become swollen and painful  Your breasts may also become engorged if you miss a feeding or you do not breastfeed on demand  The best way to decrease engorgement symptoms is to empty your breasts by feeding your baby often  Engorgement can make it hard for your baby to latch on to your breast  If this happens, express a small amount of milk and then have your baby latch on  Cold compresses, gel packs, or ice packs on your breasts can help decrease pain and swelling  Ask your caregiver how often and how long you should use cold, or ice packs  · A breast infection called mastitis  can develop if you have plugged milk ducts or engorgement  Mastitis causes your breasts to become red, swollen, and painful  You may also have flu-like symptoms, such as chills and a fever  Place heat on your breasts to help decrease the pain  You may want to place a moist, warm cloth on the painful breast or both of your breasts  Ask how often to do this  Your primary healthcare provider [facility]) may suggest that you take an NSAID, such as ibuprofen, to decrease pain and swelling  He may also order antibiotics to treat mastitis  Ask about feeding your baby when you have a breast infection  How to help prevent or manage breast problems while you are breastfeeding:   · Learn how to position your baby and latch him on correctly  To latch your baby correctly to your breast, make sure that his mouth covers most of your areola (dark area around your nipple)  He should not be attached only to the nipple  Your baby is latched on well if you feel comfortable and do not feel pain   A correct latch helps him get enough milk and can help to prevent sore nipples and other breast problems  There are several breastfeeding positions that you can try  Find the position that works best for you and your baby  Ask your caregiver for more information about how to hold and breastfeed your baby  · Prevent biting  Your baby may get teeth at about 1to 3months of age  To help prevent biting, break his suction once he is finished or if he has fallen asleep  To break his suction, slip a finger into the side of his mouth  If your baby bites you, respond with surprise or unhappiness  Offer praise when he does not bite you  · Breastfeed your baby regularly  Feed your baby 8 to 12 times a day  You may need to wake up your baby at night to feed him  It is okay to feed from 1 or both breasts at each feeding  Your baby should breastfeed from both breasts equally over the course of a day  If your baby only feeds from 1 side during a feeding, offer your other breast to him first for the next feeding  · Schedule and keep follow-up visits  Talk to your baby's pediatrician or your PHP during follow-up visits if you have breast problems  Caregivers may suggest that you, or you and your partner, attend classes on breastfeeding  You also may want to join a breastfeeding support group  Caregivers may suggest that you see a lactation consultant  This is a caregiver who can help you with breastfeeding  Contact your PHP if:   · You have a fever and chills  · You have body aches and you feel like you do not have any energy  · One or both of your breasts is red, swollen or hard, painful, and feels warm or hot  · You have breast engorgement that does not get better within 24 hours  · You see or feel a lump in your breast that hurts when you touch it  · You have nipple pain during breastfeeding or between feedings  · Your nipples are red, dry, cracked, or bleeding, or they have scabs on them       · You have questions or concerns about your condition or care  © 2014 [address] is for End User's use only and may not be sold, redistributed or otherwise used for commercial purposes  All illustrations and images included in CareNotes® are the copyrighted property of [employer]  or Chadd Hale  The above information is an  only  It is not intended as medical advice for individual conditions or treatments  Talk to your doctor, nurse or pharmacist before following any medical regimen to see if it is safe and effective for you  Vaginal Delivery   WHAT YOU SHOULD KNOW:   A vaginal delivery is the birth of your baby through your vagina (birth canal)  AFTER YOU LEAVE:   Medicines:  · NSAIDs  help decrease swelling and pain or fever  This medicine is available with or without a doctor's order  NSAIDs can cause stomach bleeding or kidney problems in certain people  If you take blood thinner medicine, always ask your healthcare provider if NSAIDs are safe for you  Always read the medicine label and follow directions  · Take your medicine as directed  Call your healthcare provider if you think your medicine is not helping or if you have side effects  Tell him if you are allergic to any medicine  Keep a list of the medicines, vitamins, and herbs you take  Include the amounts, and when and why you take them  Bring the list or the pill bottles to follow-up visits  Carry your medicine list with you in case of an emergency  Follow up with your primary healthcare provider:  Most women need to return 6 weeks after a vaginal delivery  Ask about how to care for your wounds or stitches  Write down your questions so you remember to ask them during your visits  Activity:  Rest as much as possible  Try to keep all activities short  You may be able to do some exercise soon after you have your baby  Talk with your primary healthcare provider before you start exercising   If you work outside the home, ask when you can return to your job  Kegel exercises:  Kegel exercises may help your vaginal and rectal muscles heal faster  You can do Kegel exercises by tightening and relaxing the muscles around your vagina  Kegel exercises help make the muscles stronger  Breast care:  When your milk comes in, your breasts may feel full and hard  Ask how to care for your breasts, even if you are not breastfeeding  Constipation:  Do not try to push the bowel movement out if it is too hard  High-fiber foods, extra liquids, and regular exercise can help you prevent constipation  Examples of high-fiber foods are fruit and bran  Prune juice and water are good liquids to drink  Regular exercise helps your digestive system work  You may also be told to take over-the-counter fiber and stool softener medicines  Take these items as directed  Hemorrhoids:  Pregnancy can cause severe hemorrhoids  You may have rectal pain because of the hemorrhoids  Ask how to prevent or treat hemorrhoids  Perineum care: Your perineum is the area between your vagina and anus  Keep the area clean and dry to help it heal and to prevent infection  Wash the area gently with soap and water when you bathe or shower  Rinse your perineum with warm water when you use the toilet  Your primary healthcare provider may suggest you use a warm sitz bath to help decrease pain  A sitz bath is a bathtub or basin filled to hip level  Stay in the sitz bath for 20 to 30 minutes, or as directed  Vaginal discharge: You will have vaginal discharge, called lochia, after your delivery  The lochia is bright red the first day or two after the birth  By the fourth day, the amount decreases, and it turns red-brown  Use a sanitary pad rather than a tampon to prevent a vaginal infection  It is normal to have lochia up to 8 weeks after your baby is born  Monthly periods: Your period may start again within 7 to 12 weeks after your baby is born   If you are breastfeeding, it may take longer for your period to start again  You can still get pregnant again even though you do not have your monthly period  Talk with your primary healthcare provider about a birth control method that will be good for you if you do not want to get pregnant  Mood changes: Many new mothers have some kind of mood changes after delivery  Some of these changes occur because of lack of sleep, hormone changes, and caring for a new baby  Some mood changes can be more serious, such as postpartum depression  Talk with your primary healthcare provider if you feel unable to care for yourself or your baby  Sexual activity:  You may need to avoid sex for 6 to 7 weeks after you have your baby  You may notice you have a decreased desire for sex, or sex may be painful  You may need to use a vaginal lubricant (gel) to help make sex more comfortable  Contact your primary healthcare provider if:   · You have heavy vaginal bleeding that fills 1 or more sanitary pads in 1 hour  · You have a fever  · Your pain does not go away, or gets worse  · The skin between your vagina and rectum is swollen, warm, or red  · You have swollen, hard, or painful breasts  · You feel very sad or depressed  · You feel more tired than usual      · You have questions or concerns about your condition or care  Seek care immediately or call 911 if:   · You have pus or yellow drainage coming from your vagina or wound  · You are urinating very little, or not at all  · Your arm or leg feels warm, tender, and painful  It may look swollen and red  · You feel lightheaded, have sudden and worsening chest pain, or trouble breathing  You may have more pain when you take deep breaths or cough, or you may cough up blood  © 2014 [address] is for End User's use only and may not be sold, redistributed or otherwise used for commercial purposes   All illustrations and images included in CareNotes® are the copyrighted property of A D A M , Inc  or Chadd Hale  The above information is an  only  It is not intended as medical advice for individual conditions or treatments  Talk to your doctor, nurse or pharmacist before following any medical regimen to see if it is safe and effective for you

## 2018-07-01 NOTE — H&P
H&P Exam - Obstetrics   Audra Smith 29 y o  female MRN: [id number]  Unit/Bed#: L&D 324-01 Encounter: [id number]    Assessment/Plan     Assessment:  29year old   001 at 39 5 weeks, O positive, GBS negative currently admitted for active labor    Plan:   admit   IV access  CBC, RPR, type and screen  A1 GDM:  Blood sugars every 2 hours   Epidural upon request  Expectant management of labor    History of Present Illness     HPI:  Audra Smith is a 29 y o   female with an NANCY of 7/3/2018, by Patient Reported at 39w5d weeks gestation who is being admitted for  Active labor  Her current obstetrical history is significant for  A1 GDM,  polyhydramnios  Contractions:  Every 3 minutes  Leakage of fluid: None  Bleeding: None  Fetal movement: present  Pregnancy complications:    A1 GDM  polyhydramnios    Review of Systems   Constitutional: Negative  HENT: Negative  Respiratory: Negative  Cardiovascular: Negative  Gastrointestinal: Positive for abdominal pain  Uterine contractions   Genitourinary: Negative  Musculoskeletal: Negative  All other systems reviewed and are negative  Historical Information   OB History    Para Term  AB Living   2 1 1     1   SAB TAB Ectopic Multiple Live Births                  # Outcome Date GA Lbr Kiran/2nd Weight Sex Delivery Anes PTL Lv   2 Current            1 Term                 Baby complications/comments: none  Past Medical History:   Diagnosis Date    Depression      No past surgical history on file  Social History   History   Alcohol Use No     History   Drug Use No     History   Smoking Status    Never Smoker   Smokeless Tobacco    Never Used     Family History: non-contributory    Meds/Allergies   all medications and allergies reviewed  Allergies   Allergen Reactions    Sulfa Antibiotics        Objective   Vitals: currently breastfeeding  There is no height or weight on file to calculate BMI      Invasive Devices No matching active lines, drains, or airways          Physical Exam   Constitutional: She is oriented to person, place, and time  She appears well-developed and well-nourished  HENT:   Head: Normocephalic and atraumatic  Neck: Normal range of motion  Neck supple  Cardiovascular: Normal rate and regular rhythm  Pulmonary/Chest: Effort normal and breath sounds normal    Abdominal: Soft  Bowel sounds are normal    gravid   Genitourinary: Vagina normal and uterus normal    Genitourinary Comments: SVE 7/100/0   Neurological: She is alert and oriented to person, place, and time  Prenatal Labs:    blood type:  O positive   Antibody screen:  Negative   RPR:  Negative   Hepatitis-B:  Negative   HIV:  Negative   GC chlamydia: Negative  Varicella: IMMUNE  Rubella: immune  Glucola: 202  Group B strep: Negative    Imaging, EKG, Pathology, and Other Studies: I have personally reviewed pertinent reports  Teaching Physician Statement  I performed history and exam of patient  I discussed with the Resident  I agree with the resident's documented findings and plan of care

## 2018-07-01 NOTE — L&D DELIVERY NOTE
Delivery Summary - OB/GYN   Yahir Pack 29 y o  female MRN: [id number]  Unit/Bed#: L&D 324-01 Encounter: [id number]    Pre-delivery Diagnosis:   1  39w5d pregnancy  2  Active labor  3  A1GDM  4  GBS neg    Post-delivery Diagnosis: same, delivered    Attending: Delilah Ely DO    Assistant(s): Sophie England MD    Procedure:     Anesthesia: epidural    Estimated Blood Loss:  450 mL    Specimens:   1  Arterial and venous cord gases  2  Cord blood  3  Segment of umbilical cord  4  Placenta to storage     Complications:  None apparent    Findings:  1  Viable male  delivered on 18 at 0856 weighing 9# 2oz,  Apgar scores of 8 at one minute and 9 at five minutes  2  Spontaneous delivery of placenta with centrally inserted 3-vessel cord  3  2nd degree perineal laceration, repaired with 3-0Vicryl rapid  4  Gases:   Results for Melva Hein (MRN [id number]) as of 2018 09:21   Ref  Range 2018 08:57   PH CORD VENOUS Latest Ref Range: 7 190 - 7 490  7 375   PCO2 CORD VENOUS Latest Ref Range: 27 0 - 43 0 mm HG 48 6 (H)   PO2 CORD VENOUS Latest Ref Range: 15 0 - 45 0 mm HG 23 4   HCO3 CORD VENOUS Latest Ref Range: 12 2 - 28 6 mmol/L 27 8   BASE EXCESS CORD VENOUS Latest Ref Range: 1 0 - 9 0 mmol/L 1 7   O2 CONTENT CORD VENOUS Latest Units: mL/dL 12 1   O2 HGB,VENOUS CORD Latest Units: % 51 6            Disposition: Patient tolerated the procedure well and was recovering in labor and delivery room with family and  before being transferred to the post-partum floor  Procedure Details     Description of procedure    After pushing for 11 minutes, on 18 at 0856 patient delivered a viable male , weighing 9#2oz, Apgars of 8 (1 min) and 9 (5 min)  The fetal vertex delivered spontaneously  There was no nuchal cord  The anterior shoulder delivered atraumatically with maternal expulsive forces and the assistance of downward traction   The posterior shoulder delivered with maternal expulsive forces and the assistance of upward traction  The remainder of the fetus delivered spontaneously  Upon delivery, the infant was placed on the mothers abdomen and the cord was clamped and cut  The infant was noted to cry spontaneously and was moving all extremities appropriately  There was no evidence for injury  Awaiting nurse resuscitators evaluated the  at bedside  Arterial and venous cord blood gases and cord blood was collected for analysis  These were promptly sent to the lab  In the immediate post-partum, 30 units of IV pitocin was administered and the uterus was noted to contract down well with massage and pitocin  The placenta delivered spontaneously at 0900 and was noted to have a centrally inserted 3 vessel cord  The vagina, cervix, and perineum were inspected and there was noted to be a 2nd degree perineal laceration  Laceration Repair  Patient was comfortable with epidural at that time  A 2nd Degree perineal laceration was identified and required repair  Laceration was repaired with 3-0 Vicryl rapid in  A running fashion to reapproximate the laceration  Good hemostasis was confirmed at the conclusion of this procedure  At the conclusion of the delivery, all needle, sponge, and instrument counts were noted to be correct  Patient tolerated the procedure well and was allowed to recover in labor and delivery room with family and  before being transferred to the post-partum floor  Dr Keerthi Sandoval was present and participated in all key portions of the case

## 2018-07-01 NOTE — ANESTHESIA PREPROCEDURE EVALUATION
Review of Systems/Medical History  Patient summary reviewed  Chart reviewed      Cardiovascular  Negative cardio ROS Exercise tolerance (METS): >4,     Pulmonary  Negative pulmonary ROS        GI/Hepatic  Negative GI/hepatic ROS          Negative  ROS        Endo/Other  Diabetes poorly controlled gestational ,      GYN       Hematology  Negative hematology ROS      Musculoskeletal  Negative musculoskeletal ROS        Neurology  Negative neurology ROS      Psychology   Depression ,              Physical Exam    Airway    Mallampati score: II  TM Distance: <3 FB  Neck ROM: full     Dental       Cardiovascular  Comment: Negative ROS, Rhythm: regular, Rate: normal,     Pulmonary  Breath sounds clear to auscultation,     Other Findings        Anesthesia Plan  ASA Score- 3     Anesthesia Type- epidural with ASA Monitors  Additional Monitors:   Airway Plan:         Plan Factors- Patient instructed to abstain from smoking on day of procedure        Induction-     Postoperative Plan-     Informed Consent-

## 2018-07-02 LAB — RPR SER QL: NORMAL

## 2018-07-02 RX ADMIN — ESCITALOPRAM OXALATE 10 MG: 10 TABLET ORAL at 21:20

## 2018-07-02 RX ADMIN — IBUPROFEN 600 MG: 600 TABLET ORAL at 09:00

## 2018-07-02 RX ADMIN — DOCUSATE SODIUM 100 MG: 100 CAPSULE, LIQUID FILLED ORAL at 09:00

## 2018-07-02 RX ADMIN — IBUPROFEN 600 MG: 600 TABLET ORAL at 19:39

## 2018-07-02 RX ADMIN — WITCH HAZEL 1 PAD: 500 SOLUTION RECTAL; TOPICAL at 14:18

## 2018-07-02 RX ADMIN — DOCUSATE SODIUM 100 MG: 100 CAPSULE, LIQUID FILLED ORAL at 19:39

## 2018-07-02 NOTE — LACTATION NOTE
This note was copied from a baby's chart  CONSULT - LACTATION  Baby Boy  Kvng Lara 1 days male MRN: [id number]    [id number] [facility] NURSE Room / Bed: L&D 309(N)/L&D 309(N) Encounter: [id number]    Maternal Information     MOTHER:  Denise Lara  Maternal Age: 29 y o    OB History: #: 1, Date: None, Sex: None, Weight: None, GA: None, Delivery: None, Apgar1: None, Apgar5: None, Living: None, Birth Comments: None    #: 2, Date: 18, Sex: Male, Weight: 4139 g (9 lb 2 oz), GA: 39w5d, Delivery: Vaginal, Spontaneous Delivery, Apgar1: 8, Apgar5: 9, Living: Living, Birth Comments: None   Previouse breast reduction surgery? No    Lactation history:   Has patient previously breast fed: Yes   How long had patient previously breast fed: 2 weeks   Previous breast feeding complications: None   No past surgical history on file  Birth information:  YOB: 2018   Time of birth: 8:56 AM   Sex: male   Delivery type: Vaginal, Spontaneous Delivery   Birth Weight: 4139 g (9 lb 2 oz)   Percent of Weight Change: -2%     Gestational Age: 38w11d   [unfilled]    Assessment     Breast and nipple assessment: normal assessment     Assessment: normal assessment    Feeding assessment: feeding well  LATCH:  Latch: Grasps breast, tongue down, lips flanged, rhythmic sucking   Audible Swallowing: Spontaneous and intermittent (24 hours old)   Type of Nipple: Everted (After stimulation)   Comfort (Breast/Nipple): Filling, red/small blisters/bruises, mild/moderate discomfort   Hold (Positioning): Full assist, teach one side, mother does other, staff holds   [facility] Score: 8          Feeding recommendations:  breast feed on demand     Met with mother  Provided mother with Ready, Set, Baby booklet  Discussed Skin to Skin contact an benefits to mom and baby  Talked about the delay of the first bath until baby has adjusted  Spoke about the benefits of rooming in   Feeding on cue and what that means for recognizing infant's hunger  Avoidance of pacifiers for the first month discussed  Talked about exclusive breastfeeding for the first 6 months  Positioning and latch reviewed as well as showing images of other feeding positions  Discussed the properties of a good latch in any position  Reviewed hand/manual expression  Discussed s/s that baby is getting enough milk and some s/s that breastfeeding dyad may need further help  Gave information on common concerns, what to expect the first few weeks after delivery, preparing for other caregivers, and how partners can help  Resources for support also provided  Went over feeding log since birth for the first week  Emphasized 8 or more (12) feedings in a 24 hour period, what to expect for the number of diapers per day of life and the progression of properties of the  stooling pattern  Discussed s/s that breastfeeding is going well after day 4 and when to get help from a pediatrician or lactation support person after day 4  Booklet included Breast Pumping Instructions, When You Go Back to Work or School, and Breastfeeding Resources for after discharge including access to the number for the LALA Mata RN 2018 11:36 AM

## 2018-07-02 NOTE — PROGRESS NOTES
Progress Note - OB/GYN   Ang Kelley 29 y o  female MRN: [id number]  Unit/Bed#: L&D 309-01 Encounter: [id number]    Ang Kelley is a patient of [facility]    Assessment:  Post partum day #1 s/p , stable, and doing well, requesting early discharge today if her 's bili screen returns normal    Plan:  1  A1GDM  2  Continue routine post partum care   - Encourage ambulation   - Encourage breastfeeding  3  Continue current meds    - See list below   - Pain controlled  4  Disposition   - Stable, vitals WNL   - Anticipate discharge home today if possible      Subjective/Objective     Chief Complaint:     Post partum day 1 from a  with no complaints today    Subjective:   Pain: no  Tolerating Oral Intake: yes  Voiding: yes  Flatus: yes  Bowel Movement: no  Ambulating: yes  Breastfeeding: Breastfeeding  Chest Pain: no  Shortness of Breath: no  Leg Pain/Discomfort: no  Lochia: minimal    Objective:   Vitals: /64 (BP Location: Right arm)   Pulse 83   Temp 98 2 °F (36 8 °C) (Oral)   Resp 16   Ht 5' 9" (1 753 m)   Wt 104 kg (230 lb)   Breastfeeding?  Yes   BMI 33 97 kg/m²       Intake/Output Summary (Last 24 hours) at 18 0643  Last data filed at 18 1501   Gross per 24 hour   Intake                0 ml   Output             1250 ml   Net            -1250 ml       Lab Results   Component Value Date    WBC 9 03 2018    HGB 13 2 2018    HCT 38 9 2018    MCV 91 2018     2018       Meds/Allergies   Current Facility-Administered Medications   Medication Dose Route Frequency    acetaminophen (TYLENOL) tablet 650 mg  650 mg Oral Q6H PRN    benzocaine-menthol-lanolin-aloe (DERMOPLAST) 20-0 5 % topical spray   Topical 4x Daily PRN    calcium carbonate (TUMS) chewable tablet 1,000 mg  1,000 mg Oral Daily PRN    diphenhydrAMINE (BENADRYL) tablet 25 mg  25 mg Oral Q6H PRN    docusate sodium (COLACE) capsule 100 mg  100 mg Oral BID    escitalopram (LEXAPRO) tablet 10 mg  10 mg Oral HS    hydrocortisone 1 % cream 1 application  1 application Topical PRN    ibuprofen (MOTRIN) tablet 600 mg  600 mg Oral Q6H PRN    ondansetron (ZOFRAN) injection 4 mg  4 mg Intravenous Q8H PRN    oxyCODONE-acetaminophen (PERCOCET) 5-325 mg per tablet 1 tablet  1 tablet Oral Q4H PRN    oxyCODONE-acetaminophen (PERCOCET) 5-325 mg per tablet 2 tablet  2 tablet Oral Q4H PRN    oxytocin (PITOCIN) 30 Units in lactated ringers 500 mL infusion  250 alyce-units/min Intravenous Titrated    witch hazel-glycerin (TUCKS) topical pad 1 pad  1 pad Topical PRN       Physical Exam:  General: in no apparent distress, well developed and well nourished and non-toxic  Lungs: non-labored breathing  Abdomen: abdomen is soft without significant tenderness, masses, organomegaly or guarding  Fundus: Firm and non-tender, 2 cm below the umbilicus  Lower extremeties: nontender    Lynn Borja DO  PGY-1 OB/GYN   7/2/2018 6:43 AM

## 2018-07-02 NOTE — SOCIAL WORK
CM met with MOB and FOB, Argelia Landaverde, at bedside  MOB delivered baby boy, Arlene Cheng, vaginally at 44 weeksand 5 days  She is now  2, para 2 and the couple has a 1year old son  MOB reported having all necessary items for baby, including crib and carseat  Baby is being   MOB has breast pump already  Pediatrician will be [address]  MOB received prenatal care throughout her pregnancy  Parents reported having support system through both families, friends, etc   Parents stated they would not be eligible for [facility]  CM reminded parents to notify insurance within 30 days of baby being born to avoid any gaps in coverage  MOB denied any D&A hx  She did report a hx of anxiety, for which she sees psychiatrist once every 3 months and is prescribed Lexapro  She reported having an upcoming appointment in a few weeks  CM discussed signs of PPD and encouraged pt to call doctor asap if needed  FOB also supportive of MOB in mental health  No other CM dc concerns/needs at this time

## 2018-07-03 VITALS
DIASTOLIC BLOOD PRESSURE: 74 MMHG | HEART RATE: 76 BPM | TEMPERATURE: 98.6 F | BODY MASS INDEX: 34.07 KG/M2 | HEIGHT: 69 IN | SYSTOLIC BLOOD PRESSURE: 135 MMHG | WEIGHT: 230 LBS | RESPIRATION RATE: 16 BRPM | OXYGEN SATURATION: 98 %

## 2018-07-03 RX ORDER — ACETAMINOPHEN 325 MG/1
650 TABLET ORAL EVERY 4 HOURS PRN
Qty: 30 TABLET | Refills: 0
Start: 2018-07-03 | End: 2018-08-30

## 2018-07-03 RX ORDER — IBUPROFEN 600 MG/1
600 TABLET ORAL EVERY 6 HOURS PRN
Qty: 30 TABLET | Refills: 0
Start: 2018-07-03 | End: 2018-08-30

## 2018-07-03 RX ADMIN — DOCUSATE SODIUM 100 MG: 100 CAPSULE, LIQUID FILLED ORAL at 08:10

## 2018-07-03 NOTE — PROGRESS NOTES
Progress Note - OB/GYN   Vidal Anand 29 y o  female MRN: [id number]  Unit/Bed#: L&D 309-01 Encounter: [id number]    Vidal Anand is a patient of [facility]    Assessment:  Post partum day #2 s/p , stable, and doing well    Plan:  1  A1GDM  2  Continue routine post partum care   - Encourage ambulation   - Encourage breastfeeding  3  Continue current meds    - See list below   - Pain controlled  4  Disposition   - Stable, vitals WNL   -  getting bili treatment, f/u bili levels   - Anticipate discharge home today with potential boarding if  requires bili treatment      Subjective/Objective     Chief Complaint:     Post partum day 2 from a  with no complaints today    Subjective:   Pain: no  Tolerating Oral Intake: yes  Voiding: yes  Flatus: yes  Bowel Movement: no  Ambulating: yes  Breastfeeding: Breastfeeding  Chest Pain: no  Shortness of Breath: no  Leg Pain/Discomfort: no  Lochia: minimal    Objective:   Vitals: /81 (BP Location: Right arm)   Pulse 72   Temp 98 4 °F (36 9 °C) (Oral)   Resp 18   Ht 5' 9" (1 753 m)   Wt 104 kg (230 lb)   Breastfeeding?  Yes   BMI 33 97 kg/m²     No intake or output data in the 24 hours ending 18 0633    Lab Results   Component Value Date    WBC 9 03 2018    HGB 13 2 2018    HCT 38 9 2018    MCV 91 2018     2018       Meds/Allergies   Current Facility-Administered Medications   Medication Dose Route Frequency    acetaminophen (TYLENOL) tablet 650 mg  650 mg Oral Q6H PRN    benzocaine-menthol-lanolin-aloe (DERMOPLAST) 20-0 5 % topical spray   Topical 4x Daily PRN    calcium carbonate (TUMS) chewable tablet 1,000 mg  1,000 mg Oral Daily PRN    diphenhydrAMINE (BENADRYL) tablet 25 mg  25 mg Oral Q6H PRN    docusate sodium (COLACE) capsule 100 mg  100 mg Oral BID    escitalopram (LEXAPRO) tablet 10 mg  10 mg Oral HS    hydrocortisone 1 % cream 1 application  1 application Topical PRN    ibuprofen (MOTRIN) tablet 600 mg  600 mg Oral Q6H PRN    ondansetron (ZOFRAN) injection 4 mg  4 mg Intravenous Q8H PRN    oxyCODONE-acetaminophen (PERCOCET) 5-325 mg per tablet 1 tablet  1 tablet Oral Q4H PRN    oxyCODONE-acetaminophen (PERCOCET) 5-325 mg per tablet 2 tablet  2 tablet Oral Q4H PRN    oxytocin (PITOCIN) 30 Units in lactated ringers 500 mL infusion  250 alyce-units/min Intravenous Titrated    witch hazel-glycerin (TUCKS) topical pad 1 pad  1 pad Topical PRN       Physical Exam:  General: in no apparent distress, well developed and well nourished and non-toxic  Lungs: non-labored breathing  Abdomen: abdomen is soft without significant tenderness, masses, organomegaly or guarding  Fundus: Firm and non-tender, 1 cm below the umbilicus  Lower extremeties: nontender    Gerhard Cornelius DO  PGY-2 OB/GYN   7/3/2018 6:33 AM

## 2018-07-03 NOTE — PLAN OF CARE
Problem: Knowledge Deficit  Goal: Verbalizes understanding of labor plan  Assess patient/family/caregiver's baseline knowledge level and ability to understand information  Provide education via patient/family/caregiver's preferred learning method at appropriate level of understanding  1  Provide teaching at level of understanding  2  Provide teaching via preferred learning method(s)  Outcome: Progressing    Goal: Patient/family/caregiver demonstrates understanding of disease process, treatment plan, medications, and discharge instructions  Complete learning assessment and assess knowledge base  Interventions:  - Provide teaching at level of understanding  - Provide teaching via preferred learning methods   Outcome: Progressing      Problem: Labor & Delivery  Goal: Manages discomfort  Assess and monitor for signs and symptoms of discomfort  Assess patient's pain level regularly and per hospital policy  Administer medications as ordered  Support use of nonpharmacological methods to help control pain such as distraction, imagery, relaxation, and application of heat and cold  Collaborate with interdisciplinary team and patient to determine appropriate pain management plan  1  Include patient in decisions related to comfort  2  Offer non-pharmacological pain management interventions  3  Report ineffective pain management to physician  Outcome: Completed Date Met: 07/03/18    Goal: Patient vital signs are stable  1  Assess vital signs - vaginal delivery     Outcome: Completed Date Met: 07/03/18      Problem: PAIN - ADULT  Goal: Verbalizes/displays adequate comfort level or baseline comfort level  Interventions:  - Encourage patient to monitor pain and request assistance  - Assess pain using appropriate pain scale  - Administer analgesics based on type and severity of pain and evaluate response  - Implement non-pharmacological measures as appropriate and evaluate response  - Consider cultural and social influences on pain and pain management  - Notify physician/advanced practitioner if interventions unsuccessful or patient reports new pain   Outcome: Progressing      Problem: INFECTION - ADULT  Goal: Absence or prevention of progression during hospitalization  INTERVENTIONS:  - Assess and monitor for signs and symptoms of infection  - Monitor lab/diagnostic results  - Monitor all insertion sites, i e  indwelling lines, tubes, and drains  - Monitor endotracheal (as able) and nasal secretions for changes in amount and color  - [place] appropriate cooling/warming therapies per order  - Administer medications as ordered  - Instruct and encourage patient and family to use good hand hygiene technique  - Identify and instruct in appropriate isolation precautions for identified infection/condition   Outcome: Progressing    Goal: Absence of fever/infection during neutropenic period  INTERVENTIONS:  - Monitor WBC  - Implement neutropenic guidelines   Outcome: Progressing      Problem: SAFETY ADULT  Goal: Patient will remain free of falls  INTERVENTIONS:  - Assess patient frequently for physical needs  -  Identify cognitive and physical deficits and behaviors that affect risk of falls    -  [place] fall precautions as indicated by assessment   - Educate patient/family on patient safety including physical limitations  - Instruct patient to call for assistance with activity based on assessment  - Modify environment to reduce risk of injury  - Consider OT/PT consult to assist with strengthening/mobility   Outcome: Progressing    Goal: Maintain or return to baseline ADL function  INTERVENTIONS:  -  Assess patient's ability to carry out ADLs; assess patient's baseline for ADL function and identify physical deficits which impact ability to perform ADLs (bathing, care of mouth/teeth, toileting, grooming, dressing, etc )  - Assess/evaluate cause of self-care deficits   - Assess range of motion  - Assess patient's mobility; develop plan if impaired  - Assess patient's need for assistive devices and provide as appropriate  - Encourage maximum independence but intervene and supervise when necessary  ¯ Involve family in performance of ADLs  ¯ Assess for home care needs following discharge   ¯ Request OT consult to assist with ADL evaluation and planning for discharge  ¯ Provide patient education as appropriate   Outcome: Progressing    Goal: Maintain or return mobility status to optimal level  INTERVENTIONS:  - Assess patient's baseline mobility status (ambulation, transfers, stairs, etc )    - Identify cognitive and physical deficits and behaviors that affect mobility  - Identify mobility aids required to assist with transfers and/or ambulation (gait belt, sit-to-stand, lift, walker, cane, etc )  - [place] fall precautions as indicated by assessment  - Record patient progress and toleration of activity level on Mobility SBAR; progress patient to next Phase/Stage  - Instruct patient to call for assistance with activity based on assessment  - Request Rehabilitation consult to assist with strengthening/weightbearing, etc    Outcome: Progressing      Problem: DISCHARGE PLANNING  Goal: Discharge to home or other facility with appropriate resources  INTERVENTIONS:  - Identify barriers to discharge w/patient and caregiver  - Arrange for needed discharge resources and transportation as appropriate  - Identify discharge learning needs (meds, wound care, etc )  - Arrange for interpretive services to assist at discharge as needed  - Refer to Case Management Department for coordinating discharge planning if the patient needs post-hospital services based on physician/advanced practitioner order or complex needs related to functional status, cognitive ability, or social support system   Outcome: Progressing      Problem: BIRTH - VAGINAL/ SECTION  Goal: Fetal and maternal status remain reassuring during the birth process  INTERVENTIONS:  - Monitor vital signs  - Monitor fetal heart rate  - Monitor uterine activity  - Monitor labor progression (vaginal delivery)  - DVT prophylaxis  - Antibiotic prophylaxis   Outcome: Progressing    Goal: Emotionally satisfying birthing experience for mother/fetus  Interventions:  - Assess, plan, implement and evaluate the nursing care given to the patient in labor  - Advocate the philosophy that each childbirth experience is a unique experience and support the family's chosen level of involvement and control during the labor process   - Actively participate in both the patient's and family's teaching of the birth process  - Consider cultural, Jain and age-specific factors and plan care for the patient in labor   Outcome: Progressing      Problem: POSTPARTUM  Goal: Experiences normal postpartum course  INTERVENTIONS:  - Monitor maternal vital signs  - Assess uterine involution and lochia   Outcome: Progressing    Goal: Appropriate maternal -  bonding  INTERVENTIONS:  - Identify family support  - Assess for appropriate maternal/infant bonding   -Encourage maternal/infant bonding opportunities  - Referral to  or  as needed   Outcome: Progressing    Goal: Establishment of infant feeding pattern  INTERVENTIONS:  - Assess breast/bottle feeding  - Refer to lactation as needed   Outcome: Progressing    Goal: Incision(s), wounds(s) or drain site(s) healing without S/S of infection  INTERVENTIONS  - Assess and document risk factors for skin impairment   - Assess and document dressing, incision, wound bed, drain sites and surrounding tissue  - Initiate Nutrition services consult and/or wound management as needed   Outcome: Progressing      Problem: DISCHARGE PLANNING - CARE MANAGEMENT  Goal: Discharge to post-acute care or home with appropriate resources  INTERVENTIONS:  - Conduct assessment to determine patient/family and health care team treatment goals, and need for post-acute services based on payer coverage, community resources, and patient preferences, and barriers to discharge  - Address psychosocial, clinical, and financial barriers to discharge as identified in assessment in conjunction with the patient/family and health care team  - Arrange appropriate level of post-acute services according to patients   needs and preference and payer coverage in collaboration with the physician and health care team  - Communicate with and update the patient/family, physician, and health care team regarding progress on the discharge plan  - Arrange appropriate transportation to post-acute venues   Outcome: Progressing

## 2018-07-03 NOTE — PLAN OF CARE
Problem: Knowledge Deficit  Goal: Verbalizes understanding of labor plan  Assess patient/family/caregiver's baseline knowledge level and ability to understand information  Provide education via patient/family/caregiver's preferred learning method at appropriate level of understanding  1  Provide teaching at level of understanding  2  Provide teaching via preferred learning method(s)  Outcome: Progressing    Goal: Patient/family/caregiver demonstrates understanding of disease process, treatment plan, medications, and discharge instructions  Complete learning assessment and assess knowledge base    Interventions:  - Provide teaching at level of understanding  - Provide teaching via preferred learning methods   Outcome: Adequate for Discharge      Problem: PAIN - ADULT  Goal: Verbalizes/displays adequate comfort level or baseline comfort level  Interventions:  - Encourage patient to monitor pain and request assistance  - Assess pain using appropriate pain scale  - Administer analgesics based on type and severity of pain and evaluate response  - Implement non-pharmacological measures as appropriate and evaluate response  - Consider cultural and social influences on pain and pain management  - Notify physician/advanced practitioner if interventions unsuccessful or patient reports new pain   Outcome: Adequate for Discharge      Problem: INFECTION - ADULT  Goal: Absence or prevention of progression during hospitalization  INTERVENTIONS:  - Assess and monitor for signs and symptoms of infection  - Monitor lab/diagnostic results  - Monitor all insertion sites, i e  indwelling lines, tubes, and drains  - Monitor endotracheal (as able) and nasal secretions for changes in amount and color  - [place] appropriate cooling/warming therapies per order  - Administer medications as ordered  - Instruct and encourage patient and family to use good hand hygiene technique  - Identify and instruct in appropriate isolation precautions for identified infection/condition   Outcome: Completed Date Met: 07/03/18    Goal: Absence of fever/infection during neutropenic period  INTERVENTIONS:  - Monitor WBC  - Implement neutropenic guidelines   Outcome: Completed Date Met: 07/03/18      Problem: SAFETY ADULT  Goal: Patient will remain free of falls  INTERVENTIONS:  - Assess patient frequently for physical needs  -  Identify cognitive and physical deficits and behaviors that affect risk of falls    -  [place] fall precautions as indicated by assessment   - Educate patient/family on patient safety including physical limitations  - Instruct patient to call for assistance with activity based on assessment  - Modify environment to reduce risk of injury  - Consider OT/PT consult to assist with strengthening/mobility   Outcome: Completed Date Met: 07/03/18    Goal: Maintain or return to baseline ADL function  INTERVENTIONS:  -  Assess patient's ability to carry out ADLs; assess patient's baseline for ADL function and identify physical deficits which impact ability to perform ADLs (bathing, care of mouth/teeth, toileting, grooming, dressing, etc )  - Assess/evaluate cause of self-care deficits   - Assess range of motion  - Assess patient's mobility; develop plan if impaired  - Assess patient's need for assistive devices and provide as appropriate  - Encourage maximum independence but intervene and supervise when necessary  ¯ Involve family in performance of ADLs  ¯ Assess for home care needs following discharge   ¯ Request OT consult to assist with ADL evaluation and planning for discharge  ¯ Provide patient education as appropriate   Outcome: Adequate for Discharge    Goal: Maintain or return mobility status to optimal level  INTERVENTIONS:  - Assess patient's baseline mobility status (ambulation, transfers, stairs, etc )    - Identify cognitive and physical deficits and behaviors that affect mobility  - Identify mobility aids required to assist with transfers and/or ambulation (gait belt, sit-to-stand, lift, walker, cane, etc )  - [place] fall precautions as indicated by assessment  - Record patient progress and toleration of activity level on Mobility SBAR; progress patient to next Phase/Stage  - Instruct patient to call for assistance with activity based on assessment  - Request Rehabilitation consult to assist with strengthening/weightbearing, etc    Outcome: Adequate for Discharge      Problem: DISCHARGE PLANNING  Goal: Discharge to home or other facility with appropriate resources  INTERVENTIONS:  - Identify barriers to discharge w/patient and caregiver  - Arrange for needed discharge resources and transportation as appropriate  - Identify discharge learning needs (meds, wound care, etc )  - Arrange for interpretive services to assist at discharge as needed  - Refer to Case Management Department for coordinating discharge planning if the patient needs post-hospital services based on physician/advanced practitioner order or complex needs related to functional status, cognitive ability, or social support system   Outcome: Completed Date Met: 18      Problem: BIRTH - VAGINAL/ SECTION  Goal: Fetal and maternal status remain reassuring during the birth process  INTERVENTIONS:  - Monitor vital signs  - Monitor fetal heart rate  - Monitor uterine activity  - Monitor labor progression (vaginal delivery)  - DVT prophylaxis  - Antibiotic prophylaxis   Outcome: Completed Date Met: 18    Goal: Emotionally satisfying birthing experience for mother/fetus  Interventions:  - Assess, plan, implement and evaluate the nursing care given to the patient in labor  - Advocate the philosophy that each childbirth experience is a unique experience and support the family's chosen level of involvement and control during the labor process   - Actively participate in both the patient's and family's teaching of the birth process  - Consider cultural, Christian and age-specific factors and plan care for the patient in labor   Outcome: Completed Date Met: 18      Problem: POSTPARTUM  Goal: Experiences normal postpartum course  INTERVENTIONS:  - Monitor maternal vital signs  - Assess uterine involution and lochia   Outcome: Adequate for Discharge    Goal: Appropriate maternal -  bonding  INTERVENTIONS:  - Identify family support  - Assess for appropriate maternal/infant bonding   -Encourage maternal/infant bonding opportunities  - Referral to  or  as needed   Outcome: Adequate for Discharge    Goal: Establishment of infant feeding pattern  INTERVENTIONS:  - Assess breast/bottle feeding  - Refer to lactation as needed   Outcome: Adequate for Discharge    Goal: Incision(s), wounds(s) or drain site(s) healing without S/S of infection  INTERVENTIONS  - Assess and document risk factors for skin impairment   - Assess and document dressing, incision, wound bed, drain sites and surrounding tissue  - Initiate Nutrition services consult and/or wound management as needed   Outcome: Adequate for Discharge      Problem: DISCHARGE PLANNING - CARE MANAGEMENT  Goal: Discharge to post-acute care or home with appropriate resources  INTERVENTIONS:  - Conduct assessment to determine patient/family and health care team treatment goals, and need for post-acute services based on payer coverage, community resources, and patient preferences, and barriers to discharge  - Address psychosocial, clinical, and financial barriers to discharge as identified in assessment in conjunction with the patient/family and health care team  - Arrange appropriate level of post-acute services according to patients   needs and preference and payer coverage in collaboration with the physician and health care team  - Communicate with and update the patient/family, physician, and health care team regarding progress on the discharge plan  - Arrange appropriate transportation to post-acute venues   Outcome: Completed Date Met: 07/03/18

## 2018-07-03 NOTE — LACTATION NOTE
This note was copied from a baby's chart  Met with mother to go over feeding log since birth for the first week  Emphasized 8 or more (12) feedings in a 24 hour period, what to expect for the number of diapers per day of life and the progression of properties of the  stooling pattern  Discussed s/s that breastfeeding is going well after day 4 and when to get help from a pediatrician or lactation support person after day 4  Booklet included Breast Pumping Instructions, When You Go Back to Work or School, and Breastfeeding Resources for after discharge including access to the number for the [employer]  Discussed s/s engorgement and how to manage with medications and cool compresses as well as s/s mastitis and when to contact physician  Saw latch at this time  Mom latching well, discussed how to get a deeper latch and repositioned in cross cradle

## 2018-07-12 LAB — PLACENTA IN STORAGE: NORMAL

## 2018-07-19 ENCOUNTER — OFFICE VISIT (OUTPATIENT)
Dept: PSYCHIATRY | Facility: CLINIC | Age: 28
End: 2018-07-19
Payer: COMMERCIAL

## 2018-07-19 DIAGNOSIS — F41.1 GENERALIZED ANXIETY DISORDER: ICD-10-CM

## 2018-07-19 DIAGNOSIS — F42.2 MIXED OBSESSIONAL THOUGHTS AND ACTS: Primary | ICD-10-CM

## 2018-07-19 PROCEDURE — 99214 OFFICE O/P EST MOD 30 MIN: CPT | Performed by: PHYSICIAN ASSISTANT

## 2018-07-19 NOTE — PSYCH
PROGRESS NOTE        [facility]      Name and Date of Birth:  Creig Lefort 29 y o  1990    Date of Visit: 07/19/18    SUBJECTIVE:  Pt seen for follow up  Pt had healthy baby boy Alton Reynolds on July 1st   No issues with vaginal delivery  States she is doing well and sleeping at night- baby awakens once during night  No panic attacks or anxiety attacks  She is breast feeding and pediatrician aware of lexapro  Moods are stable  Sleep is fair and appetite is good  She worries about "post partum" as she had before but she was not on medication after her last preganancy  Reassurance provided and she will call if any issues  She denies suicidal ideation, intent or plan at present, has no suicidal ideation, intent or plan at present  She denies any auditory hallucinations and visual hallucinations, denies any other delusional thinking, denies any delusional thinking  She denies any side effects from medications    HPI ROS Appetite Changes and Sleep: normal appetite, normal sleep    Review Of Systems:      Constitutional Negative   ENT Negative   Cardiovascular Negative   Respiratory Negative   Gastrointestinal Negative   Genitourinary Negative   Musculoskeletal Negative   Integumentary Negative   Neurological Negative   Endocrine Negative   Other Symptoms Negative and None       Laboratory Results: No results found for this or any previous visit  Substance Abuse History:    History   Drug Use No       Family Psychiatric History:     No family history on file  The following portions of the patient's history were reviewed and updated as appropriate: past family history, past medical history, past social history, past surgical history and problem list     Social History     Social History    Marital status: Single     Spouse name: N/A    Number of children: N/A    Years of education: N/A     Occupational History    Not on file  Social History Main Topics    Smoking status: Never Smoker    Smokeless tobacco: Never Used    Alcohol use No    Drug use: No    Sexual activity: Not on file     Other Topics Concern    Not on file     Social History Narrative    No narrative on file     Social History     Social History Narrative    No narrative on file        Social History     Tobacco History     Smoking Status  Never Smoker    Smokeless Tobacco Use  Never Used          Alcohol History     Alcohol Use Status  No          Drug Use     Drug Use Status  No          Sexual Activity     Sexually Active  Not Asked          Activities of Daily Living    Not Asked                     OBJECTIVE:     Mental Status Evaluation:    Appearance age appropriate, casually dressed   Behavior pleasant, cooperative   Speech normal volume, normal pitch   Mood euthymic   Affect Mood congruent   Thought Processes logical   Associations intact associations   Thought Content normal   Perceptual Disturbances: none   Abnormal Thoughts  Risk Potential Suicidal ideation - None  Homicidal ideation - None  Potential for aggression - No   Orientation oriented to person, place, time/date and situation   Memory recent and remote memory grossly intact   Cosciousness alert and awake   Attention Span attention span and concentration are age appropriate   Intellect Appears to be of Average Intelligence   Insight age appropriate    Judgement good    Muscle Strength and  Gait muscle strength and tone were normal   Language no difficulty naming common objects   Fund of Knowledge displays adequate knowledge of current events   Pain none   Pain Scale 0       Assessment/Plan:       Diagnoses and all orders for this visit:    Mixed obsessional thoughts and acts    Generalized anxiety disorder          Treatment Recommendations/Precautions:  Lexapro 10 mg po qd    Continue current medications    Risks/Benefits      Risks, Benefits And Possible Side Effects Of Medications:    Risks, benefits, and possible side effects of medications explained to patient and patient verbalizes understanding and agreement for treatment      Controlled Medication Discussion:     Not applicable    Psychotherapy Provided:     Individual psychotherapy provided: No

## 2018-08-30 ENCOUNTER — OFFICE VISIT (OUTPATIENT)
Dept: PSYCHIATRY | Facility: CLINIC | Age: 28
End: 2018-08-30
Payer: COMMERCIAL

## 2018-08-30 DIAGNOSIS — F42.2 MIXED OBSESSIONAL THOUGHTS AND ACTS: Primary | ICD-10-CM

## 2018-08-30 DIAGNOSIS — F41.1 GENERALIZED ANXIETY DISORDER: ICD-10-CM

## 2018-08-30 DIAGNOSIS — F41.1 GAD (GENERALIZED ANXIETY DISORDER): ICD-10-CM

## 2018-08-30 PROCEDURE — 99214 OFFICE O/P EST MOD 30 MIN: CPT | Performed by: PHYSICIAN ASSISTANT

## 2018-08-30 RX ORDER — NORETHINDRONE 0.35 MG/1
TABLET ORAL
COMMUNITY
Start: 2018-08-23 | End: 2019-04-10

## 2018-08-30 RX ORDER — ESCITALOPRAM OXALATE 10 MG/1
TABLET ORAL
Qty: 30 TABLET | Refills: 3 | Status: CANCELLED | OUTPATIENT
Start: 2018-08-30

## 2018-08-30 RX ORDER — ESCITALOPRAM OXALATE 10 MG/1
10 TABLET ORAL DAILY
Qty: 90 TABLET | Refills: 1 | Status: SHIPPED | OUTPATIENT
Start: 2018-08-30 | End: 2019-03-04 | Stop reason: SDUPTHER

## 2018-08-30 NOTE — PSYCH
PROGRESS NOTE        [facility]      Name and Date of Birth:  Anahi Alonzo 29 y o  1990    Date of Visit: 08/30/18    SUBJECTIVE:  Pt seen for follow up  No evidence of post partum depression- good spirits  Baby is dong well and healthy  She will be going back to work at end of September and "mixed emotion" about that and baby going into   She is "looking forward to getting back into her regular routine though" with work  Anxiety and depression stable  No issues and no issues with meds  Sleeping about 7 hours and appetite is good  No new medical issues- on the "mini" birth control pill since last week and some break though bleeding but otherwise no issues  Continues to breast feed and pediatrician aware of meds  She denies suicidal ideation, intent or plan at present, has no suicidal ideation, intent or plan at present  She denies any auditory hallucinations and visual hallucinations, denies any other delusional thinking, denies any delusional thinking  She denies any side effects from medications    HPI ROS Appetite Changes and Sleep: normal appetite, normal sleep    Review Of Systems:      Constitutional Negative   ENT Negative   Cardiovascular Negative   Respiratory Negative   Gastrointestinal Negative   Genitourinary Negative   Musculoskeletal Negative   Integumentary Negative   Neurological Negative   Endocrine Negative   Other Symptoms Negative and None       Laboratory Results: No results found for this or any previous visit  Substance Abuse History:    History   Drug Use No       Family Psychiatric History:     No family history on file      The following portions of the patient's history were reviewed and updated as appropriate: past family history, past medical history, past social history, past surgical history and problem list     Social History     Social History    Marital status: Single     Spouse name: N/A    Number of children: N/A    Years of education: N/A     Occupational History    Not on file       Social History Main Topics    Smoking status: Never Smoker    Smokeless tobacco: Never Used    Alcohol use No    Drug use: No    Sexual activity: Not on file     Other Topics Concern    Not on file     Social History Narrative    No narrative on file     Social History     Social History Narrative    No narrative on file        Social History     Tobacco History     Smoking Status  Never Smoker    Smokeless Tobacco Use  Never Used          Alcohol History     Alcohol Use Status  No          Drug Use     Drug Use Status  No          Sexual Activity     Sexually Active  Not Asked          Activities of Daily Living    Not Asked                     OBJECTIVE:     Mental Status Evaluation:    Appearance age appropriate, casually dressed   Behavior pleasant, cooperative   Speech normal volume, normal pitch   Mood euthymic   Affect appropriate   Thought Processes logical   Associations intact associations   Thought Content normal   Perceptual Disturbances: none   Abnormal Thoughts  Risk Potential Suicidal ideation - None  Homicidal ideation - None  Potential for aggression - No   Orientation oriented to person, place, time/date and situation   Memory recent and remote memory grossly intact   Cosciousness alert and awake   Attention Span attention span and concentration are age appropriate   Intellect Appears to be of Average Intelligence   Insight age appropriate    Judgement good    Muscle Strength and  Gait muscle strength and tone were normal   Language no difficulty naming common objects   Fund of Knowledge displays adequate knowledge of current events   Pain none   Pain Scale 0       Assessment/Plan:       Diagnoses and all orders for this visit:    Mixed obsessional thoughts and acts    Generalized anxiety disorder    ROBINA (generalized anxiety disorder)  -     escitalopram (LEXAPRO) 10 mg tablet; Take 1 tablet (10 mg total) by mouth daily    Other orders  -     JUSTYNA 0 35 MG tablet;           Treatment Recommendations/Precautions:  Lexapro 10 mg po qd    Continue current medications    Risks/Benefits      Risks, Benefits And Possible Side Effects Of Medications:    Risks, benefits, and possible side effects of medications explained to patient and patient verbalizes understanding and agreement for treatment      Controlled Medication Discussion:     Not applicable    Psychotherapy Provided:     Individual psychotherapy provided: No

## 2018-08-31 DIAGNOSIS — F41.1 GAD (GENERALIZED ANXIETY DISORDER): ICD-10-CM

## 2018-08-31 RX ORDER — ESCITALOPRAM OXALATE 10 MG/1
TABLET ORAL
Qty: 30 TABLET | Refills: 3 | OUTPATIENT
Start: 2018-08-31

## 2018-11-08 ENCOUNTER — OFFICE VISIT (OUTPATIENT)
Dept: PSYCHIATRY | Facility: CLINIC | Age: 28
End: 2018-11-08
Payer: COMMERCIAL

## 2018-11-08 DIAGNOSIS — F42.2 MIXED OBSESSIONAL THOUGHTS AND ACTS: Primary | ICD-10-CM

## 2018-11-08 DIAGNOSIS — F41.1 GENERALIZED ANXIETY DISORDER: ICD-10-CM

## 2018-11-08 PROCEDURE — 99214 OFFICE O/P EST MOD 30 MIN: CPT | Performed by: PHYSICIAN ASSISTANT

## 2018-11-08 NOTE — PSYCH
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 [place] and her two small children are in   She is handling things well and states that her anxiety has been manageable  No panic attacks and no significant depressive episodes  She states some decreased sleep at night due to her BP waking up but otherwise no issues  She has a good appetite  Good relationship with significant other  No new medical or physical issues  She denies suicidal ideation, intent or plan at present, has no suicidal ideation, intent or plan at present  She denies any auditory hallucinations and visual hallucinations, denies any other delusional thinking, denies any delusional thinking  She denies any side effects from medications    HPI ROS Appetite Changes and Sleep: normal appetite, normal sleep    Review Of Systems:      Constitutional Negative   ENT Negative   Cardiovascular Negative   Respiratory Negative   Gastrointestinal Negative   Genitourinary Negative   Musculoskeletal Negative   Integumentary Negative   Neurological Negative   Endocrine Negative   Other Symptoms Negative and None       Laboratory Results: No results found for this or any previous visit  Substance Abuse History:    History   Drug Use No       Family Psychiatric History:     No family history on file      The following portions of the patient's history were reviewed and updated as appropriate: past family history, past medical history, past social history, past surgical history and problem list     Social History     Social History    Marital status: Single     Spouse name: N/A    Number of children: N/A    Years of education: N/A     Occupational History    Not on file  Social History Main Topics    Smoking status: Never Smoker    Smokeless tobacco: Never Used    Alcohol use No    Drug use: No    Sexual activity: Not on file     Other Topics Concern    Not on file     Social History Narrative    No narrative on file     Social History     Social History Narrative    No narrative on file        Social History     Tobacco History     Smoking Status  Never Smoker    Smokeless Tobacco Use  Never Used          Alcohol History     Alcohol Use Status  No          Drug Use     Drug Use Status  No          Sexual Activity     Sexually Active  Not Asked          Activities of Daily Living    Not Asked                     OBJECTIVE:     Mental Status Evaluation:    Appearance age appropriate, casually dressed   Behavior pleasant, cooperative   Speech normal volume, normal pitch   Mood Euthymic   Affect Appropriate   Thought Processes logical   Associations intact associations   Thought Content normal   Perceptual Disturbances: none   Abnormal Thoughts  Risk Potential Suicidal ideation - None  Homicidal ideation - None  Potential for aggression - No   Orientation oriented to person, place, time/date and situation   Memory recent and remote memory grossly intact   Cosciousness alert and awake   Attention Span attention span and concentration are age appropriate   Intellect Appears to be of Average Intelligence   Insight age appropriate    Judgement good    Muscle Strength and  Gait muscle strength and tone were normal   Language no difficulty naming common objects   Fund of Knowledge displays adequate knowledge of current events   Pain none   Pain Scale 0       Assessment/Plan:       Diagnoses and all orders for this visit:    Mixed obsessional thoughts and acts    Generalized anxiety disorder          Treatment Recommendations/Precautions:  Patient has continue to be stable off Seroquel  Will continue to monitor mood especially with winter months coming up    Continue with Lexapro 10 mg daily as ordered  Follow-up in five months and p r n  Risks/Benefits      Risks, Benefits And Possible Side Effects Of Medications:    Risks, benefits, and possible side effects of medications explained to patient and patient verbalizes understanding and agreement for treatment      Controlled Medication Discussion:     Not applicable    Psychotherapy Provided:     Individual psychotherapy provided: No

## 2019-03-04 DIAGNOSIS — F41.1 GAD (GENERALIZED ANXIETY DISORDER): ICD-10-CM

## 2019-03-04 RX ORDER — ESCITALOPRAM OXALATE 10 MG/1
TABLET ORAL
Qty: 90 TABLET | Refills: 1 | Status: SHIPPED | OUTPATIENT
Start: 2019-03-04 | End: 2019-09-01 | Stop reason: SDUPTHER

## 2019-04-10 ENCOUNTER — OFFICE VISIT (OUTPATIENT)
Dept: PSYCHIATRY | Facility: CLINIC | Age: 29
End: 2019-04-10
Payer: COMMERCIAL

## 2019-04-10 VITALS — BODY MASS INDEX: 31.07 KG/M2 | HEIGHT: 68 IN | WEIGHT: 205 LBS | RESPIRATION RATE: 18 BRPM

## 2019-04-10 DIAGNOSIS — F42.2 MIXED OBSESSIONAL THOUGHTS AND ACTS: Primary | ICD-10-CM

## 2019-04-10 DIAGNOSIS — F41.1 GENERALIZED ANXIETY DISORDER: ICD-10-CM

## 2019-04-10 PROCEDURE — 99213 OFFICE O/P EST LOW 20 MIN: CPT | Performed by: PHYSICIAN ASSISTANT

## 2019-04-10 RX ORDER — LEVONORGESTREL/ETHINYL ESTRADIOL AND ETHINYL ESTRADIOL 100-20(84)
KIT ORAL
COMMUNITY
Start: 2019-03-12 | End: 2019-11-27

## 2019-09-01 DIAGNOSIS — F41.1 GAD (GENERALIZED ANXIETY DISORDER): ICD-10-CM

## 2019-09-03 RX ORDER — ESCITALOPRAM OXALATE 10 MG/1
TABLET ORAL
Qty: 30 TABLET | Refills: 5 | Status: SHIPPED | OUTPATIENT
Start: 2019-09-03 | End: 2020-03-05 | Stop reason: SDUPTHER

## 2019-11-27 ENCOUNTER — OFFICE VISIT (OUTPATIENT)
Dept: PSYCHIATRY | Facility: CLINIC | Age: 29
End: 2019-11-27
Payer: COMMERCIAL

## 2019-11-27 DIAGNOSIS — F42.2 MIXED OBSESSIONAL THOUGHTS AND ACTS: Primary | ICD-10-CM

## 2019-11-27 DIAGNOSIS — F41.1 GAD (GENERALIZED ANXIETY DISORDER): ICD-10-CM

## 2019-11-27 PROCEDURE — 99213 OFFICE O/P EST LOW 20 MIN: CPT | Performed by: PHYSICIAN ASSISTANT

## 2019-11-27 NOTE — PSYCH
PROGRESS NOTE        [facility]      Name and Date of Birth:  Poly Gabriel 34 y o  1990    Date of Visit: 11/27/19    SUBJECTIVE:  Patient seen for follow-up of generalized anxiety and mixed obsessional thoughts  Patient overall has been at her baseline  States that she continues with residual anxiety but overall has been managing well  No depressive episodes  She is sleeping well and has adequate appetite  Both of her sons are doing well  States that she had the flu last week but is recuperated well from  Otherwise no new medical issues or concerns  Good relationship with family and significant other  States that work has been going well also  No recent panic attacks  She denies suicidal ideation, intent or plan at present, has no suicidal ideation, intent or plan at present  She denies any auditory hallucinations and visual hallucinations, denies any other delusional thinking, denies any delusional thinking  She denies any side effects from medications    HPI ROS Appetite Changes and Sleep: normal appetite, normal sleep    Review Of Systems:      Constitutional Negative   ENT Negative   Cardiovascular Negative   Respiratory Negative   Gastrointestinal Negative   Genitourinary Negative   Musculoskeletal Negative   Integumentary Negative   Neurological Negative   Endocrine Negative   Other Symptoms Negative and None       Laboratory Results: No results found for this or any previous visit  Substance Abuse History:    Social History     Substance and Sexual Activity   Drug Use No       Family Psychiatric History:     No family history on file      The following portions of the patient's history were reviewed and updated as appropriate: past family history, past medical history, past social history, past surgical history and problem list     Social History     Socioeconomic History    Marital status: Single     Spouse name: Not on file    Number of children: Not on file    Years of education: Not on file    Highest education level: Not on file   Occupational History    Not on file   Social Needs    Financial resource strain: Not on file    Food insecurity:     Worry: Not on file     Inability: Not on file    Transportation needs:     Medical: Not on file     Non-medical: Not on file   Tobacco Use    Smoking status: Never Smoker    Smokeless tobacco: Never Used   Substance and Sexual Activity    Alcohol use: No    Drug use: No    Sexual activity: Not on file   Lifestyle    Physical activity:     Days per week: Not on file     Minutes per session: Not on file    Stress: Not on file   Relationships    Social connections:     Talks on phone: Not on file     Gets together: Not on file     Attends [employer] service: Not on file     Active member of club or organization: Not on file     Attends meetings of clubs or organizations: Not on file     Relationship status: Not on file    Intimate partner violence:     Fear of current or ex partner: Not on file     Emotionally abused: Not on file     Physically abused: Not on file     Forced sexual activity: Not on file   Other Topics Concern    Not on file   Social History Narrative    Not on file     Social History     Social History Narrative    Not on file        Social History     Tobacco History     Smoking Status  Never Smoker    Smokeless Tobacco Use  Never Used          Alcohol History     Alcohol Use Status  No          Drug Use     Drug Use Status  No          Sexual Activity     Sexually Active  Not Asked          Activities of Daily Living    Not Asked                     OBJECTIVE:     Mental Status Evaluation:    Appearance age appropriate, casually dressed   Behavior pleasant, cooperative   Speech normal volume, normal pitch   Mood Euthymic   Affect Appropriate   Thought Processes logical   Associations intact associations   Thought Content normal   Perceptual Disturbances: none   Abnormal Thoughts  Risk Potential Suicidal ideation - None  Homicidal ideation - None  Potential for aggression - No   Orientation oriented to person, place, time/date and situation   Memory recent and remote memory grossly intact   Cosciousness alert and awake   Attention Span attention span and concentration are age appropriate   Intellect Appears to be of Average Intelligence   Insight age appropriate    Judgement good    Muscle Strength and  Gait muscle strength and tone were normal   Language no difficulty naming common objects   Fund of Knowledge displays adequate knowledge of current events   Pain none   Pain Scale 0       Assessment/Plan:       Diagnoses and all orders for this visit:    Mixed obsessional thoughts and acts    ROBINA (generalized anxiety disorder)          Treatment Recommendations/Precautions:  Lexapro 10 mg p o  Daily  Follow-up six months  Continue current medications    Risks/Benefits      Risks, Benefits And Possible Side Effects Of Medications:    Risks, benefits, and possible side effects of medications explained to patient and patient verbalizes understanding and agreement for treatment      Controlled Medication Discussion:     Not applicable    Psychotherapy Provided:     Individual psychotherapy provided: No

## 2020-03-05 DIAGNOSIS — F41.1 GAD (GENERALIZED ANXIETY DISORDER): ICD-10-CM

## 2020-03-05 RX ORDER — ESCITALOPRAM OXALATE 10 MG/1
10 TABLET ORAL DAILY
Qty: 30 TABLET | Refills: 2 | Status: SHIPPED | OUTPATIENT
Start: 2020-03-05 | End: 2020-05-19 | Stop reason: SDUPTHER

## 2020-03-05 NOTE — TELEPHONE ENCOUNTER
Elgin Stark called for refill         Upcoming appt 5/20/20      [facility]# [phone]

## 2020-05-19 DIAGNOSIS — F41.1 GAD (GENERALIZED ANXIETY DISORDER): ICD-10-CM

## 2020-05-19 RX ORDER — ESCITALOPRAM OXALATE 10 MG/1
10 TABLET ORAL DAILY
Qty: 30 TABLET | Refills: 2 | Status: SHIPPED | OUTPATIENT
Start: 2020-05-19 | End: 2020-08-19 | Stop reason: SDUPTHER

## 2020-05-28 ENCOUNTER — TELEMEDICINE (OUTPATIENT)
Dept: PSYCHIATRY | Facility: CLINIC | Age: 30
End: 2020-05-28

## 2020-05-28 DIAGNOSIS — F41.1 GAD (GENERALIZED ANXIETY DISORDER): ICD-10-CM

## 2020-05-28 DIAGNOSIS — F42.2 MIXED OBSESSIONAL THOUGHTS AND ACTS: Primary | ICD-10-CM

## 2020-05-28 PROCEDURE — 99214 OFFICE O/P EST MOD 30 MIN: CPT | Performed by: PHYSICIAN ASSISTANT

## 2020-05-28 RX ORDER — ALPRAZOLAM 0.25 MG/1
0.25 TABLET ORAL 3 TIMES DAILY PRN
Qty: 90 TABLET | Refills: 0 | Status: SHIPPED | OUTPATIENT
Start: 2020-05-28 | End: 2021-02-22 | Stop reason: SDUPTHER

## 2020-07-28 ENCOUNTER — LAB REQUISITION (OUTPATIENT)
Dept: LAB | Facility: HOSPITAL | Age: 30
End: 2020-07-28
Payer: COMMERCIAL

## 2020-07-28 DIAGNOSIS — Z01.419 ENCOUNTER FOR GYNECOLOGICAL EXAMINATION (GENERAL) (ROUTINE) WITHOUT ABNORMAL FINDINGS: ICD-10-CM

## 2020-07-28 PROCEDURE — G0145 SCR C/V CYTO,THINLAYER,RESCR: HCPCS | Performed by: OBSTETRICS & GYNECOLOGY

## 2020-07-28 PROCEDURE — 87624 HPV HI-RISK TYP POOLED RSLT: CPT | Performed by: OBSTETRICS & GYNECOLOGY

## 2020-07-30 LAB
HPV HR 12 DNA CVX QL NAA+PROBE: NEGATIVE
HPV16 DNA CVX QL NAA+PROBE: NEGATIVE
HPV18 DNA CVX QL NAA+PROBE: NEGATIVE

## 2020-07-31 LAB
LAB AP GYN PRIMARY INTERPRETATION: NORMAL
Lab: NORMAL

## 2020-08-19 ENCOUNTER — OFFICE VISIT (OUTPATIENT)
Dept: PSYCHIATRY | Facility: CLINIC | Age: 30
End: 2020-08-19
Payer: COMMERCIAL

## 2020-08-19 DIAGNOSIS — F41.1 GAD (GENERALIZED ANXIETY DISORDER): ICD-10-CM

## 2020-08-19 DIAGNOSIS — F42.2 MIXED OBSESSIONAL THOUGHTS AND ACTS: Primary | ICD-10-CM

## 2020-08-19 PROCEDURE — 99214 OFFICE O/P EST MOD 30 MIN: CPT | Performed by: PHYSICIAN ASSISTANT

## 2020-08-19 RX ORDER — ESCITALOPRAM OXALATE 10 MG/1
10 TABLET ORAL DAILY
Qty: 30 TABLET | Refills: 2 | Status: SHIPPED | OUTPATIENT
Start: 2020-08-19 | End: 2020-11-21

## 2020-08-19 RX ORDER — ESCITALOPRAM OXALATE 10 MG/1
TABLET ORAL
Qty: 30 TABLET | Refills: 2 | OUTPATIENT
Start: 2020-08-19

## 2020-08-19 NOTE — PSYCH
Virtual Brief Visit    Assessment/Plan:    Problem List Items Addressed This Visit        Other    OCD (obsessive compulsive disorder) - Primary    Relevant Medications    escitalopram (LEXAPRO) 10 mg tablet      Other Visit Diagnoses     ROBINA (generalized anxiety disorder)        Relevant Medications    escitalopram (LEXAPRO) 10 mg tablet                Reason for visit is   Chief Complaint   Patient presents with    Virtual Brief Visit    Medication Management    Anxiety        Encounter provider Ricco Mast PA-C    Provider located at [address] [address]    Recent Visits  No visits were found meeting these conditions  Showing recent visits within past 7 days and meeting all other requirements     Today's Visits  Date Type Provider Dept   08/19/20 Office Visit Krishna Banegas 855 today's visits and meeting all other requirements     Future Appointments  No visits were found meeting these conditions  Showing future appointments within next 150 days and meeting all other requirements        After connecting through telephone, the patient was identified by name and date of birth  Lalitha Mckenzie was informed that this is a telemedicine visit and that the visit is being conducted through telephone  My office door was closed  No one else was in the room  She acknowledged consent and understanding of privacy and security of the platform  The patient has agreed to participate and understands she can discontinue the visit at any time  Patient is aware this is a billable service  Subjective    Lalitha Mckenzie is a 27 y o  female with anxiety and OCD  HPI   Patient seen for follow-up of anxiety and OCD and medication check  Patient states she continues with residual anxiety due to pandemic    She was laid off from her job so has been at home with the two children  States that she has worries about felipe virus again since she tested positive a couple months ago  Some difficulties with her significant other who she reports feels that she is over anxious regarding the virus  Reports that she took the 0 25 mg Xanax on several occasions but it made her somewhat somnolent  She has not been taking on a regular basis  Reports that she is sleeping well at night  Appetite is adequate  Continues to take Lexapro as prescribed  Physically she is feeling well no new medication medical issues  Past Medical History:   Diagnosis Date    Depression        No past surgical history on file  Current Outpatient Medications   Medication Sig Dispense Refill    ALPRAZolam (XANAX) 0 25 mg tablet Take 1 tablet (0 25 mg total) by mouth 3 (three) times a day as needed for anxiety 90 tablet 0    escitalopram (LEXAPRO) 10 mg tablet Take 1 tablet (10 mg total) by mouth daily 30 tablet 2    Ethynodiol Diac-Eth Estradiol (ZOVIA 1/35E, 28, PO) Daily       No current facility-administered medications for this visit  Allergies   Allergen Reactions    Pollen Extract     Short Ragweed Pollen Ext     Sulfa Antibiotics        Review of Systems   No physical complaints of pain or discomfort, sleeping well and eating well    Mental status exam:  Speech is clear coherent, normal rate and volume  Mood is slightly anxious, no significant depressive episodes  Linear coherent thought process  No suicidal homicidal ideation  No psychotic signs symptoms, no hallucinations or delusions  Linear and coherent thought process  Cognition is intact  Insight and judgment is intact    There were no vitals filed for this visit       Medications follows:  Lexapro 10 mg daily  Xanax 0 25 mg t i d  P r n -rarely taking  Will follow-up in two to three months    I spent 20 minutes directly with the patient during this visit    VIRTUAL VISIT Ihsan Herrera acknowledges that she has consented to an online visit or consultation  She understands that the online visit is based solely on information provided by her, and that, in the absence of a face-to-face physical evaluation by the physician, the diagnosis she receives is both limited and provisional in terms of accuracy and completeness  This is not intended to replace a full medical face-to-face evaluation by the physician  Moises Betancourt understands and accepts these terms

## 2020-08-19 NOTE — BH TREATMENT PLAN
TREATMENT PLAN (Medication Management Only)        [facility]    Name and Date of Birth:  Dev Neville 27 y o  1990  Date of Treatment Plan: August 19, 2020  Diagnosis/Diagnoses:    1  Mixed obsessional thoughts and acts    2  ROBINA (generalized anxiety disorder)      Strengths/Personal Resources for Self-Care: supportive family, supportive friends, taking medications as prescribed  Area/Areas of need (in own words): anxiety symptoms  1  Long Term Goal: continue improvement in acceptable anxiety level  Target Date: 2 months - 10/19/2020  Person/Persons responsible for completion of goal: Denise Braden  Short Term Objective (s) - How will we reach this goal?:   A  Provider new recommended medication/dosage changes and/or continue medication(s): continue current medications as prescribed  B  N/A   C  N/A  Target Date: 2 months - 10/19/2020  Person/Persons Responsible for Completion of Goal: Rhoda Aaron  Progress Towards Goals: continuing treatment  Treatment Modality: medication management every 2 months  Review due 90 to 120 days from date of this plan: Six months  Expected length of service: ongoing treatment  My Physician/PA/NP and I have developed this plan together and I agree to work on the goals and objectives  I understand the treatment goals that were developed for my treatment    Patient appointment done over telephone due to [employer] so unable to sign treatment plan

## 2020-11-21 DIAGNOSIS — F41.1 GAD (GENERALIZED ANXIETY DISORDER): ICD-10-CM

## 2020-11-21 RX ORDER — ESCITALOPRAM OXALATE 10 MG/1
TABLET ORAL
Qty: 30 TABLET | Refills: 2 | Status: SHIPPED | OUTPATIENT
Start: 2020-11-21 | End: 2021-02-22 | Stop reason: SDUPTHER

## 2021-02-22 DIAGNOSIS — F42.2 MIXED OBSESSIONAL THOUGHTS AND ACTS: ICD-10-CM

## 2021-02-22 DIAGNOSIS — F41.1 GAD (GENERALIZED ANXIETY DISORDER): ICD-10-CM

## 2021-02-22 RX ORDER — ESCITALOPRAM OXALATE 10 MG/1
10 TABLET ORAL DAILY
Qty: 30 TABLET | Refills: 2 | Status: SHIPPED | OUTPATIENT
Start: 2021-02-22 | End: 2021-03-04 | Stop reason: SDUPTHER

## 2021-02-22 RX ORDER — ALPRAZOLAM 0.25 MG/1
0.25 TABLET ORAL 3 TIMES DAILY PRN
Qty: 90 TABLET | Refills: 0 | Status: SHIPPED | OUTPATIENT
Start: 2021-02-22 | End: 2021-03-04

## 2021-02-22 NOTE — TELEPHONE ENCOUNTER
Pt has been trying to call to set up an appt  She also needs a medication refill   Please call @ [phone]

## 2021-03-04 ENCOUNTER — TELEMEDICINE (OUTPATIENT)
Dept: PSYCHIATRY | Facility: CLINIC | Age: 31
End: 2021-03-04
Payer: COMMERCIAL

## 2021-03-04 DIAGNOSIS — F42.2 MIXED OBSESSIONAL THOUGHTS AND ACTS: Primary | ICD-10-CM

## 2021-03-04 DIAGNOSIS — F41.1 GAD (GENERALIZED ANXIETY DISORDER): ICD-10-CM

## 2021-03-04 PROCEDURE — 99213 OFFICE O/P EST LOW 20 MIN: CPT | Performed by: PHYSICIAN ASSISTANT

## 2021-03-04 RX ORDER — ESCITALOPRAM OXALATE 10 MG/1
10 TABLET ORAL DAILY
Qty: 30 TABLET | Refills: 5 | Status: SHIPPED | OUTPATIENT
Start: 2021-03-04 | End: 2021-09-02 | Stop reason: SDUPTHER

## 2021-03-04 NOTE — BH TREATMENT PLAN
TREATMENT PLAN (Medication Management Only)        [facility]    Name and Date of Birth:  Liberty Wiley 32 y o  1990  Date of Treatment Plan: March 4, 2021  Diagnosis/Diagnoses:    1  Mixed obsessional thoughts and acts    2  ROBINA (generalized anxiety disorder)      Strengths/Personal Resources for Self-Care: supportive family, supportive friends, taking medications as prescribed  Area/Areas of need (in own words): Continue low anxiety  1  Long Term Goal: maintain control of anxiety  Target Date:6 months - 9/4/2021  Person/Persons responsible for completion of goal: Denise Braden  Short Term Objective (s) - How will we reach this goal?:   A  Provider new recommended medication/dosage changes and/or continue medication(s): continue current medications as prescribed  B  N/A   C  N/A  Target Date:6 months - 9/4/2021  Person/Persons Responsible for Completion of Goal: Lillie Vargas  Progress Towards Goals: stable  Treatment Modality: medication management every 6 months  Review due 180 days from date of this plan: 6 months - 9/4/2021  Expected length of service: ongoing treatment  My Physician/PA/NP and I have developed this plan together and I agree to work on the goals and objectives  I understand the treatment goals that were developed for my treatment

## 2021-03-04 NOTE — PSYCH
Virtual Brief Visit    Assessment/Plan:    Problem List Items Addressed This Visit        Other    OCD (obsessive compulsive disorder) - Primary    Relevant Medications    escitalopram (LEXAPRO) 10 mg tablet      Other Visit Diagnoses     ROBINA (generalized anxiety disorder)        Relevant Medications    escitalopram (LEXAPRO) 10 mg tablet                Reason for visit is   Chief Complaint   Patient presents with    Follow-up    Medication Management    Virtual Brief Visit        Encounter provider Abelino Mejia PA-C    Provider located at [facility] [address]    Recent Visits  No visits were found meeting these conditions  Showing recent visits within past 7 days and meeting all other requirements     Today's Visits  Date Type Provider Dept   03/04/21 Telemedicine FIDEL [address] today's visits and meeting all other requirements     Future Appointments  No visits were found meeting these conditions  Showing future appointments within next 150 days and meeting all other requirements        After connecting through telephone, the patient was identified by name and date of birth  Phoebe Patel was informed that this is a telemedicine visit and that the visit is being conducted through telephone  My office door was closed  No one else was in the room  She acknowledged consent and understanding of privacy and security of the platform  The patient has agreed to participate and understands she can discontinue the visit at any time  Patient is aware this is a billable service  Subjective    Phoebe Patel is a 32 y o  female with anxiety/OCD  HPI   Courtney West has been doing well with her moods and managing anxiety  Obsessive thoughts have been significantly diminished and manageable      Overall she feels at her baseline with current medications and treatment plan  Sleeping and eating well  Has good motivation and interest   No significant depressive episodes  No suicidal or homicidal ideation  Denies any recent panic attacks  Has not needed Xanax since our last visit  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  Her 2 children are doing well and good relationship with significant other  Physically she is doing well and no new medications are medical issues  Medication list is reviewed and updated  Past Medical History:   Diagnosis Date    Depression        No past surgical history on file  Current Outpatient Medications   Medication Sig Dispense Refill    escitalopram (LEXAPRO) 10 mg tablet Take 1 tablet (10 mg total) by mouth daily 30 tablet 5    Ethynodiol Diac-Eth Estradiol (ZOVIA 1/35E, 28, PO) Daily      Multiple Vitamin (MULTIVITAMIN ADULT PO) multivitamin       No current facility-administered medications for this visit  Allergies   Allergen Reactions    Pollen Extract     Short Ragweed Pollen Ext     Sulfa Antibiotics        Review of Systems   As noted in HPI    MSE:  Pt with clear and coherent speech, normal rate and volume   Mood is euthymic  Linear coherent thought process   No suicidal or homicidal ideation  No psychotic signs or symptoms, no hallucinations or delusions  Cognition is intact   Insight judgment is intact      Medications as follows:   Lexapro 10 mg daily   Xanax 0 25 mg, has not been taking  Will follow-up in six months and she will call sooner if any questions or concerns  There were no vitals filed for this visit  I spent 20 minutes directly with the patient during this visit    VIRTUAL VISIT Ihsan Herrera acknowledges that she has consented to an online visit or consultation   She understands that the online visit is based solely on information provided by her, and that, in the absence of a face-to-face physical evaluation by the physician, the diagnosis she receives is both limited and provisional in terms of accuracy and completeness  This is not intended to replace a full medical face-to-face evaluation by the physician  Phoebe Patel understands and accepts these terms

## 2021-09-02 ENCOUNTER — TELEMEDICINE (OUTPATIENT)
Dept: PSYCHIATRY | Facility: CLINIC | Age: 31
End: 2021-09-02

## 2021-09-02 DIAGNOSIS — F41.1 GAD (GENERALIZED ANXIETY DISORDER): ICD-10-CM

## 2021-09-02 DIAGNOSIS — F42.2 MIXED OBSESSIONAL THOUGHTS AND ACTS: Primary | ICD-10-CM

## 2021-09-02 PROCEDURE — 99213 OFFICE O/P EST LOW 20 MIN: CPT | Performed by: PHYSICIAN ASSISTANT

## 2021-09-02 RX ORDER — ESCITALOPRAM OXALATE 10 MG/1
10 TABLET ORAL DAILY
Qty: 30 TABLET | Refills: 5 | Status: SHIPPED | OUTPATIENT
Start: 2021-09-02 | End: 2022-04-20

## 2021-09-02 NOTE — PSYCH
With history of OCD   [facility]    KATHERINE was seen for follow-up of OCD and medication management  overall she has been doing fairly well over the past six months  States that she has residual anxiety or obsessive thoughts but has been manageable  Has not had any panic attacks  No significant depressive episodes  No suicidal or homicidal ideation  No psychotic signs or symptoms  She is sleeping adequately and appetite is good  States that she has gained about 10 lb over the past year  She is also in school full-time for nursing though so has been more sedentary due to studying  She will be finishing up with school in about a month and then taking her boards  Reports that she has been doing well with school  Her children are doing well  They are currently three and 10years old  States that her younger child is currently having some upper respiratory symptoms and had a COVID test today which is pending  States that she has some anxiety about this but again is managing  Past Medical History:   Diagnosis Date    Depression        No past surgical history on file  Current Outpatient Medications   Medication Sig Dispense Refill    escitalopram (LEXAPRO) 10 mg tablet Take 1 tablet (10 mg total) by mouth daily 30 tablet 5    Ethynodiol Diac-Eth Estradiol (ZOVIA 1/35E, 28, PO) Daily      Multiple Vitamin (MULTIVITAMIN ADULT PO) multivitamin       No current facility-administered medications for this visit  Allergies   Allergen Reactions    Pollen Extract     Short Ragweed Pollen Ext     Sulfa Antibiotics        Review of Systems   as noted in HPI     Video Exam    There were no vitals filed for this visit      Physical Exam   Mental status exam:   Speech is clear and coherent, normal rate and volume   Good hygiene, good eye contact  , pleasant  Affect is appropriate, mood euthymic  Linear and coherent thought process  No suicidal or homicidal ideation   No psychotic signs or symptoms, no hallucinations or delusional in cognition is intact  Insight and judgment is intact    Medications and treatment plan as follows:  Lexapro 10 mg daily  Will follow-up six months and she will call sooner if any questions or concerns    I spent 15 minutes directly with the patient during this visit    VIRTUAL VISIT Ihsan Herrera verbally agrees to participate in [employer]  Pt is aware that [employer] could be limited without vital signs or the ability to perform a full hands-on physical [address] understands she or the provider may request at any time to terminate the video visit and request the patient to seek care or treatment in person

## 2021-09-02 NOTE — BH TREATMENT PLAN
TREATMENT PLAN (Medication Management Only)        [facility]    Name and Date of Birth:  Valery Dec 32 y o  1990  Date of Treatment Plan: September 2, 2021  Diagnosis/Diagnoses:    1  Mixed obsessional thoughts and acts    2  ROBINA (generalized anxiety disorder)      Strengths/Personal Resources for Self-Care: supportive family, taking medications as prescribed  Area/Areas of need (in own words): anxiety  1  Long Term Goal: maintain control of anxiety  Target Date:6 months - 3/2/2022  Person/Persons responsible for completion of goal: Denise Braden  Short Term Objective (s) - How will we reach this goal?:   A  Provider new recommended medication/dosage changes and/or continue medication(s):  Continue current medications  B  N/A   C  N/A  Target Date:6 months - 3/2/2022  Person/Persons Responsible for Completion of Goal: Michelle Davidson  Progress Towards Goals: stable  Treatment Modality: medication management every 6 months  Review due 180 days from date of this plan: 6 months - 3/2/2022  Expected length of service: maintenance  My Physician/PA/NP and I have developed this plan together and I agree to work on the goals and objectives  I understand the treatment goals that were developed for my treatment

## 2022-04-13 NOTE — PROGRESS NOTES
Please refer to the [facility] ultrasound report in Ob Procedures for additional information regarding today's visit

## 2022-04-15 ENCOUNTER — OFFICE VISIT (OUTPATIENT)
Dept: PERINATAL CARE | Facility: OTHER | Age: 32
End: 2022-04-15
Payer: COMMERCIAL

## 2022-04-15 VITALS
DIASTOLIC BLOOD PRESSURE: 72 MMHG | BODY MASS INDEX: 36.73 KG/M2 | HEIGHT: 67 IN | HEART RATE: 88 BPM | SYSTOLIC BLOOD PRESSURE: 132 MMHG | WEIGHT: 234 LBS

## 2022-04-15 DIAGNOSIS — Z3A.16 16 WEEKS GESTATION OF PREGNANCY: ICD-10-CM

## 2022-04-15 DIAGNOSIS — Z86.32 HISTORY OF GESTATIONAL DIABETES IN PRIOR PREGNANCY, CURRENTLY PREGNANT IN SECOND TRIMESTER: ICD-10-CM

## 2022-04-15 DIAGNOSIS — O28.3 INCREASED NUCHAL TRANSLUCENCY SPACE ON FETAL ULTRASOUND: Primary | ICD-10-CM

## 2022-04-15 DIAGNOSIS — O09.292 HISTORY OF GESTATIONAL DIABETES IN PRIOR PREGNANCY, CURRENTLY PREGNANT IN SECOND TRIMESTER: ICD-10-CM

## 2022-04-15 PROCEDURE — 76805 OB US >/= 14 WKS SNGL FETUS: CPT | Performed by: OBSTETRICS & GYNECOLOGY

## 2022-04-15 PROCEDURE — 99243 OFF/OP CNSLTJ NEW/EST LOW 30: CPT | Performed by: OBSTETRICS & GYNECOLOGY

## 2022-04-15 RX ORDER — ONDANSETRON 8 MG/1
TABLET, ORALLY DISINTEGRATING ORAL
COMMUNITY
Start: 2022-03-23

## 2022-04-19 ENCOUNTER — TELEPHONE (OUTPATIENT)
Dept: PERINATAL CARE | Facility: CLINIC | Age: 32
End: 2022-04-19

## 2022-04-19 NOTE — TELEPHONE ENCOUNTER
Patient's appointment was moved up to 4/20/22  Patient aware and she prefers the link to be sent via text

## 2022-04-20 ENCOUNTER — TELEMEDICINE (OUTPATIENT)
Dept: PERINATAL CARE | Facility: CLINIC | Age: 32
End: 2022-04-20

## 2022-04-20 ENCOUNTER — TELEPHONE (OUTPATIENT)
Dept: PERINATAL CARE | Facility: CLINIC | Age: 32
End: 2022-04-20

## 2022-04-20 DIAGNOSIS — O28.3 INCREASED NUCHAL TRANSLUCENCY SPACE ON FETAL ULTRASOUND: Primary | ICD-10-CM

## 2022-04-20 DIAGNOSIS — Z31.5 ENCOUNTER FOR PROCREATIVE GENETIC COUNSELING: ICD-10-CM

## 2022-04-20 PROCEDURE — NC001 PR NO CHARGE: Performed by: GENETIC COUNSELOR, MS

## 2022-04-20 NOTE — PROGRESS NOTES
Virtual Regular Visit    Verification of patient location:    Patient is located in the following state in which I hold an active license [place]      Assessment/Plan:    Problem List Items Addressed This Visit     None               Reason for visit is   Chief Complaint   Patient presents with    Virtual Regular Visit        Encounter provider Gabriel Hendrix    Provider located at [address] [place] [id number] [phone]      Recent Visits  Date Type Provider Dept   04/19/22 Telephone [address]   Showing recent visits within past 7 days and meeting all other requirements  Future Appointments  No visits were found meeting these conditions  Showing future appointments within next 150 days and meeting all other requirements       The patient was identified by name and date of birth  Stevan Doan was informed that this is a telemedicine visit and that the visit is being conducted through [address] and patient was informed this is a secure, HIPAA-complaint platform  She agrees to proceed     My office door was closed  No one else was in the room  She acknowledged consent and understanding of privacy and security of the video platform  The patient has agreed to participate and understands they can discontinue the visit at any time  Patient is aware this is a billable service       Subjective  Genetic Counseling Note        Harvey Lara     Appointment Date:  4/20/2022  Referred By: Johanny Jones DO  YOB: 1990  Partner:  Trenton Blandon    Pregnancy History: [id number]  Estimated Date of Delivery: 09/28/2022  Estimated Gestational Age: 12 weeks 0 days     Genetic Counseling:abnormal ultrasound screen    Dalton Maguire is a(n) 28 y o  female who is here to discuss risks associated with a previously identified increased nuchal translucency measurement    Issues Discussed:  Average population risk: 3-4% in the average pregnancy of serious condition or birth defect  2-3% risk of mental retardation  Not all detected by prenatal testing  Test results  Risk of aneuploidy    Options Discussed:  Amniocentesis: risks and limitations discussed  Ethnic screening discussed: clinical and genetic basis of CF, SMA, Fragile X and expanded carrier screening  Variability and treatment addressed  Level II ultrasound to screen for structural anomalies  Fetal echocardiogram    Additional Information / Impression / Plan / Tests Ordered:  Georgia Abraham presents for genetic counseling to discuss risks associated with the previously identified increased nuchal translucency measurement of 3 2 mm  This was identified during an ultrasound performed at her primary OB office  Subsequent ultrasound performed through the department of [facility] did not confirm this finding  In addition, the nasal bone appeared absent at the time of the patient's early ultrasound but was identified at the subsequent ultrasound performed through [facility]  The patient had been previously counseled by Dr Dolly Todd of the increased risk for a chromosome abnormality of approximately 3 7% associated with nuchal translucency measurement of this size  There is an additional 2 5 percent risk for other congenital anomalies most commonly congenital heart defects  The risks, benefits, and limitations of amniocentesis were discussed with the patient  Amniocentesis is performed under direct real time ultrasound visualization to avoid both the fetus and the placenta  Once amniotic fluid is withdrawn, laboratory analysis is performed and amniotic fluid alpha-fetoprotein, as well as chromosome and/or microarray analysis is undertaken    The risk of genetic amniocentesis includes, but is not limited to less than 1 in 300 pregnancy loss rate or  delivery rate if 23 weeks or greater, infection, bleeding, rupture of membranes, failure of cells to grow, karyotype error, laboratory error, etc   Occasionally a repeat amniocentesis is necessary due to cell culture failure  Chromosome/microarray analysis from amniocentesis is 99 9% accurate and alpha-fetoprotein analysis can detect approximately 95% of open neural tube defects  We reviewed that level II anatomy ultrasound is typically performed at approximately 20 weeks gestation  Level II ultrasound evaluation is between 60-80% accurate in detecting major physical birth defects and variations in fetal development that may be associated with chromosome abnormalities  Level II ultrasound evaluation is not able to detect all birth defects or health problems  We also discussed the benefits and limitations of a fetal echocardiogram     We reviewed the testing option of cell free fetal DNA screening (also known as noninvasive prenatal testing or NIPT)  We discussed that it is a serum test to identify fragments of fetal DNA in maternal blood  We reviewed the benefits and limitations of cell free fetal DNA screening in detecting Down syndrome, Trisomy 13, Trisomy 25 and sex chromosome aneuploidies  We also discussed that cell free fetal DNA screening does not detect additional chromosomal abnormalities and the possibility of a failed test result  As cell free fetal DNA screening does not detect open neural tube defects, MSAFP screening is available at 15-20 weeks gestation  Elizabet Arias had NIPT performed earlier in the pregnancy the results of which were screen negative for trisomy 24, 25, 15, monosomy X, triploidy and 22q deletion  A copy of these test results is included in the patient's medical record  After discussing the additional prenatal diagnostic and screening procedures Elizabet Arias elected to pursue amniocentesis  She will be contacted with options for scheduling that appointment  In addition, she is scheduled for both level 2 ultrasound evaluation and fetal echocardiogram at the appropriate times        During our counseling session histories were taken on the patient's family and her partner's family both of which were negative for any prior known cases of intellectual disability, birth defects or single gene disorders  Marian Soulier reports being of [place] and [place] descent and that her partner is of [place] American and  descent  She denies either of them having any known [employer] ancestry  Carrier screening for CF, SMA, hemoglobinopathies and expanded carrier screening was discussed  Denise's stated that she had some form of carrier screening performed earlier her pregnancy the results of which were negative though a copy of those tests were not able to be located in her medical record  We will reach out to her referring OB office to obtain those records for review  Lastly, we discussed the fact that it is important to keep in mind that everyone in the general population regardless of age, family history, or medical background has approximately a 3% risk of having a child with some type of her defect, genetic disease or intellectual disability  Currently there are no tests available to rule out all birth defects or health problems  Plan:  1  Patient elects amniocentesis  Clinical coordinator notified and is expected to reach out to patient with available appointments  2  Patient scheduled for level II ultrasound and fetal echocardiogram   3  Records on carrier screening to be requested from referring OB office  Genetic counselor to reach out to patient to review once they are received  Time spent with Genetic Counselor: 40 minutes      HPI     Past Medical History:   Diagnosis Date    Depression        No past surgical history on file      Current Outpatient Medications   Medication Sig Dispense Refill    escitalopram (LEXAPRO) 10 mg tablet Take 1 tablet (10 mg total) by mouth daily 30 tablet 5    Ethynodiol Diac-Eth Estradiol (ZOVIA 1/35E, 28, PO) Daily (Patient not taking: Reported on 4/15/2022 )      Multiple Vitamin (MULTIVITAMIN ADULT PO) multivitamin      ondansetron (ZOFRAN-ODT) 8 mg disintegrating tablet PLACE 1 TABLET TWICE A DAY BY TRANSLINGUAL ROUTE AS DIRECTED FOR 30 DAYS  No current facility-administered medications for this visit  Allergies   Allergen Reactions    Pollen Extract     Short Ragweed Pollen Ext     Sulfa Antibiotics        Review of Systems    Video Exam    There were no vitals filed for this visit  Physical Exam     I spent 40 minutes directly with the patient during this visit    VIRTUAL VISIT [address]  verbally agrees to participate in [employer]  Pt is aware that [employer] could be limited without vital signs or the ability to perform a full hands-on physical [address] understands she or the provider may request at any time to terminate the video visit and request the patient to seek care or treatment in person

## 2022-04-20 NOTE — TELEPHONE ENCOUNTER
[facility] returned phone call regarding blood work and results, stating the patient never completed genetic or carrier screening

## 2022-04-20 NOTE — TELEPHONE ENCOUNTER
LV for [facility]  [facility] is looking for records for this patient in regard to carrier testing and any other genetic screens from her pregnancy thus far or any prior pregnancies

## 2022-04-25 ENCOUNTER — TELEPHONE (OUTPATIENT)
Dept: PERINATAL CARE | Facility: OTHER | Age: 32
End: 2022-04-25

## 2022-04-25 NOTE — TELEPHONE ENCOUNTER
Patient left voicemail message she is canceling amnio at [facility] today, returned call to patient and offered additional scheduling patient stated she was unable to reschedule at that time

## 2022-05-05 ENCOUNTER — TELEMEDICINE (OUTPATIENT)
Dept: PSYCHIATRY | Facility: CLINIC | Age: 32
End: 2022-05-05
Payer: COMMERCIAL

## 2022-05-05 DIAGNOSIS — F41.1 GAD (GENERALIZED ANXIETY DISORDER): ICD-10-CM

## 2022-05-05 DIAGNOSIS — F42.2 MIXED OBSESSIONAL THOUGHTS AND ACTS: Primary | ICD-10-CM

## 2022-05-05 PROCEDURE — 99214 OFFICE O/P EST MOD 30 MIN: CPT | Performed by: PHYSICIAN ASSISTANT

## 2022-05-05 RX ORDER — ESCITALOPRAM OXALATE 10 MG/1
10 TABLET ORAL DAILY
Qty: 90 TABLET | Refills: 1 | Status: SHIPPED | OUTPATIENT
Start: 2022-05-05

## 2022-05-05 NOTE — PSYCH
Virtual Regular Visit    Verification of patient location:    Patient is located in the following state in which I hold an active license [place]      Assessment/Plan:    Problem List Items Addressed This Visit        Other    OCD (obsessive compulsive disorder) - Primary    Relevant Medications    escitalopram (LEXAPRO) 10 mg tablet      Other Visit Diagnoses     ROBINA (generalized anxiety disorder)        Relevant Medications    escitalopram (LEXAPRO) 10 mg tablet          Goals addressed in session: Goal 1          Reason for visit is   Chief Complaint   Patient presents with    Medication Management    Follow-up    Virtual Regular Visit        Encounter provider Brent Vincent PA-C    Provider located at [facility] [address]      Recent Visits  No visits were found meeting these conditions  Showing recent visits within past 7 days and meeting all other requirements  Today's Visits  Date Type Provider Dept   05/05/22 Telemedicine FIDEL [address] today's visits and meeting all other requirements  Future Appointments  No visits were found meeting these conditions  Showing future appointments within next 150 days and meeting all other requirements       The patient was identified by name and date of birth  Elmer Arreola was informed that this is a telemedicine visit and [facility] the [facility] and patient was informed this is a secure, HIPAA-complaint platform  She agrees to proceed     My office door was closed  No one else was in the room  She acknowledged consent and understanding of privacy and security of the video platform  The patient has agreed to participate and understands they can discontinue the visit at any time  Patient is aware this is a billable service       Subjective  Elmer Arreola is a 28 y o  female with history of OCD and anxiety   HPI   Lisandra Wright was seen for follow-up of OCD, anxiety and for medication management  Overall she reports that she has been doing very well  Her moods have been stable  She is currently expecting her 3rd child in September  She is excited about that  She has two sons that are aged [de-identified] and [de-identified]  Reports good relationship with her significant other and her children  She is also now employed as a nurse manager  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 so she is enjoying that  She has good interest and motivation  Residual anxiety at times but manageable  No panic attacks  No significant depressive episodes  Taking Lexapro 10 mg daily and has been stable and at her baseline with this  No new medications are medical issues at this time  Physically reports that she is now feeling well  She is sleeping well at night  Occasionally will take a nap during the day if she is able to  Appetite has been good  Past Medical History:   Diagnosis Date    Depression        No past surgical history on file  Current Outpatient Medications   Medication Sig Dispense Refill    escitalopram (LEXAPRO) 10 mg tablet Take 1 tablet (10 mg total) by mouth daily 90 tablet 1    Ethynodiol Diac-Eth Estradiol (ZOVIA 1/35E, 28, PO) Daily (Patient not taking: Reported on 4/15/2022 )      Multiple Vitamin (MULTIVITAMIN ADULT PO) multivitamin      ondansetron (ZOFRAN-ODT) 8 mg disintegrating tablet PLACE 1 TABLET TWICE A DAY BY TRANSLINGUAL ROUTE AS DIRECTED FOR 30 DAYS  No current facility-administered medications for this visit  Allergies   Allergen Reactions    Pollen Extract     Short Ragweed Pollen Ext     Sulfa Antibiotics        Review of Systems  As noted in HPI  Video Exam    There were no vitals filed for this visit      Physical Exam   Status exam:   Speech is clear coherent, normal rate and volume   Adequate hygiene, good eye contact   Affect is appropriate, mood is euthymic   No suicidal or homicidal ideation   No psychotic signs symptoms, no hallucinations or delusions  Cognition is intact   Insight judgment is intact    Medications and treatment plan as follows:  Lexapro 10 mg daily  Patient is aware of potential risk versus benefit with medication during pregnancy   She was on Lexapro with her 2nd pregnancy without any adverse effects  Her OBGYN is aware she is on 10 mg of Lexapro  After her 1st pregnancy, she was not on any medication and suffered from severe postpartum depression   She will be maintained on Lexapro 10 mg and will follow-up with me postpartum in six months  She will call sooner if she has any questions or concerns  I spent 20 minutes directly with the patient during this visit and additional time reviewing chart/records    VIRTUAL VISIT Champagne Gerry Herrera verbally agrees to participate in [employer]  Pt is aware that [employer] could be limited without vital signs or the ability to perform a full hands-on physical [address] understands she or the provider may request at any time to terminate the video visit and request the patient to seek care or treatment in person

## 2022-05-05 NOTE — BH TREATMENT PLAN
TREATMENT PLAN (Medication Management Only)        [facility]    Name and Date of Birth:  Mitul Narayan 28 y o  1990  Date of Treatment Plan: May 5, 2022  Diagnosis/Diagnoses:    1  Mixed obsessional thoughts and acts    2  ROBINA (generalized anxiety disorder)      Strengths/Personal Resources for Self-Care: supportive family, good understanding of illness, motivation for treatment  Area/Areas of need (in own words): anxiety  1  Long Term Goal: maintain control of anxiety  Target Date:6 months - 11/5/2022  Person/Persons responsible for completion of goal: Denise Braden  Short Term Objective (s) - How will we reach this goal?:   A  Provider new recommended medication/dosage changes and/or continue medication(s): continue current medications as prescribed  B  N/A   C  N/A  Target Date:6 months - 11/5/2022  Person/Persons Responsible for Completion of Goal: Alvaro Funes  Progress Towards Goals: stable  Treatment Modality: medication management every 6 months  Review due 180 days from date of this plan: 6 months - 11/5/2022  Expected length of service: maintenance  My Physician/PA/NP and I have developed this plan together and I agree to work on the goals and objectives  I understand the treatment goals that were developed for my treatment

## 2022-05-10 ENCOUNTER — TELEPHONE (OUTPATIENT)
Dept: PERINATAL CARE | Facility: CLINIC | Age: 32
End: 2022-05-10

## 2022-05-10 ENCOUNTER — TELEPHONE (OUTPATIENT)
Dept: PERINATAL CARE | Facility: OTHER | Age: 32
End: 2022-05-10

## 2022-05-10 NOTE — TELEPHONE ENCOUNTER
Returned call to patient to discuss her request to reschedule  amnio @ [facility] ( patient canceled a previous amnio appt @ [facility] scheduled on  04/25/22)   Patient verbalized she is thinking she needs to do the amnio but is still not certain she wants to go thru with it  Reviewed with patient she has appt this week for level 2 US with Dr Brina Guillory Friday 05/13/22 @ [facility] 13:00  Offered for Gerhardt Pun to further discuss her concerns with [facility] provider @ Friday appt and then follow up can be scheduled  Patient verbalized she is very comfortable with this plan

## 2022-05-13 ENCOUNTER — TELEPHONE (OUTPATIENT)
Dept: PERINATAL CARE | Facility: CLINIC | Age: 32
End: 2022-05-13

## 2022-05-13 ENCOUNTER — ROUTINE PRENATAL (OUTPATIENT)
Dept: PERINATAL CARE | Facility: OTHER | Age: 32
End: 2022-05-13
Payer: COMMERCIAL

## 2022-05-13 VITALS
DIASTOLIC BLOOD PRESSURE: 60 MMHG | WEIGHT: 233 LBS | SYSTOLIC BLOOD PRESSURE: 135 MMHG | HEIGHT: 67 IN | HEART RATE: 88 BPM | BODY MASS INDEX: 36.57 KG/M2

## 2022-05-13 DIAGNOSIS — Z36.3 ENCOUNTER FOR ANTENATAL SCREENING FOR MALFORMATIONS: Primary | ICD-10-CM

## 2022-05-13 DIAGNOSIS — Z36.86 ENCOUNTER FOR ANTENATAL SCREENING FOR CERVICAL LENGTH: ICD-10-CM

## 2022-05-13 DIAGNOSIS — Z3A.20 20 WEEKS GESTATION OF PREGNANCY: ICD-10-CM

## 2022-05-13 DIAGNOSIS — O28.3 INCREASED NUCHAL TRANSLUCENCY SPACE ON FETAL ULTRASOUND: ICD-10-CM

## 2022-05-13 PROBLEM — Z3A.39 39 WEEKS GESTATION OF PREGNANCY: Status: RESOLVED | Noted: 2018-07-01 | Resolved: 2022-05-13

## 2022-05-13 PROCEDURE — 76817 TRANSVAGINAL US OBSTETRIC: CPT | Performed by: OBSTETRICS & GYNECOLOGY

## 2022-05-13 PROCEDURE — 76811 OB US DETAILED SNGL FETUS: CPT | Performed by: OBSTETRICS & GYNECOLOGY

## 2022-05-13 PROCEDURE — 99215 OFFICE O/P EST HI 40 MIN: CPT | Performed by: OBSTETRICS & GYNECOLOGY

## 2022-05-13 NOTE — LETTER
May 15, 2022    Allie Grahama 57  [address] [facility]  [address] [employer]    Patient: Everardo Lara   YOB: 1990   Date of Visit: 2022   Gestational age Markusgallo Crhis of this communication: Routine though please note she is planning amniocentesis early next week       Dear Noam Fabian,    This patient was seen recently in our  office  The content of my evaluation today is in the ultrasound report under "OB Procedures" tab  Please don't hesitate to contact our office with any concerns or questions       Sincerely,      Mikael Mckeon MD  Attending Physician, Mahendra Report to Alise. Pt in bed. No needs verbalized.

## 2022-05-13 NOTE — TELEPHONE ENCOUNTER
S/w Patricio Anderson, she confirmed follow up VV w/ [facility] Monday 05/16 @ 10:45 and re- scheduled amnio Tuesday 05/17/22 arrival [facility] @ 07:35 for 07:45 procedure  Patient will be accompanied by Dali Cordova  Gave address, directions and phone # for [facility] site  Patient verbalized her appreciation for appt scheduling

## 2022-05-13 NOTE — PROGRESS NOTES
Ultrasound Probe Disinfection    A transvaginal ultrasound was performed  Prior to use, disinfection was performed with High Level Disinfection Process ([employer])  Probe serial number [id number] was used        Donte Glynn  05/13/22  1:00 PM

## 2022-05-13 NOTE — PATIENT INSTRUCTIONS
Amniocentesis   WHAT YOU NEED TO KNOW:   What do I need to know about an amniocentesis? An amniocentesis is a procedure that is done to take a sample of amniotic fluid  Amniotic fluid surrounds your baby inside the amniotic sac  This procedure is done to diagnose certain birth defects and genetic conditions  Genetic conditions are health conditions that are passed down from parents to their baby  An amniocentesis may also show infection or how developed your baby's lungs are later in your pregnancy  What will happen during an amniocentesis? Your healthcare provider will use an ultrasound to find your baby's position  He or she will find an area to safely take a sample of fluid  Your provider may apply local anesthesia to numb the area  He or she will insert a thin needle through your abdomen, uterus, and into the amniotic sac  He or she will remove about 1 ounce of amniotic fluid  A small bandage will be placed over the puncture site  The sample of fluid will be sent to a lab for tests  What will happen after an amniocentesis? Your healthcare provider will ask you to rest on your right side for 15 to 20 minutes  You may have mild cramping or spotting after this procedure  You may also have pain or bruising at the puncture site  These symptoms usually go away on their own  Your healthcare provider may recommend that you avoid strenuous activities for 24 hours  These include jogging, aerobic exercise, and sex  What are the risks of an amniocentesis? You may get an infection in your uterus  You may have vaginal bleeding or leak amniotic fluid from your vagina  You may go into early labor  The needle could injure your baby during the procedure  Rarely, amniocentesis may cause you to have a miscarriage  This risk is higher if you have the amniocentesis before 15 weeks of pregnancy  CARE AGREEMENT:   You have the right to help plan your care  Learn about your health condition and how it may be treated  Discuss treatment options with your healthcare providers to decide what care you want to receive  You always have the right to refuse treatment  The above information is an  only  It is not intended as medical advice for individual conditions or treatments  Talk to your doctor, nurse or pharmacist before following any medical regimen to see if it is safe and effective for you  © Copyright [employer] 2022 Information is for End User's use only and may not be sold, redistributed or otherwise used for commercial purposes   All illustrations and images included in CareNotes® are the copyrighted property of A D A M , Inc  or [address] [place] [address] [employer]

## 2022-05-15 NOTE — PROGRESS NOTES
[id number] [facility]: Ms Fili Herrera was seen today for anatomic survey and cervical length screening ultrasound  See ultrasound report under "OB Procedures" tab  The time spent on this established patient on the encounter date included 10 minutes previsit service time reviewing records and precharting, 20 minutes face-to-face service time counseling regarding results and coordinating care, and  15 minutes charting, totalling 45 minutes  Please don't hesitate to contact our office with any concerns or questions    Nemesio Sanchez MD

## 2022-05-16 ENCOUNTER — TELEMEDICINE (OUTPATIENT)
Dept: PERINATAL CARE | Facility: CLINIC | Age: 32
End: 2022-05-16

## 2022-05-16 DIAGNOSIS — O28.3 INCREASED NUCHAL TRANSLUCENCY SPACE ON FETAL ULTRASOUND: Primary | ICD-10-CM

## 2022-05-16 DIAGNOSIS — Z31.5 ENCOUNTER FOR PROCREATIVE GENETIC COUNSELING: ICD-10-CM

## 2022-05-16 PROCEDURE — NC001 PR NO CHARGE: Performed by: GENETIC COUNSELOR, MS

## 2022-05-16 NOTE — PROGRESS NOTES
Virtual Brief Visit    Patient is located in the following state in which I hold an active license [place]      Assessment/Plan:  Patient requested follow-up genetic counseling to discuss remaining risk for aneuploidy due to increased nuchal translucency measurement of 3 2 mm in light of screen negative NIPT and normal level 2 ultrasound evaluation  Explained to the patient that the data on risk of chromosome abnormalities due to increased nuchal translucency was obtained prior to routine NIPT testing being available and routine chromosomal microarray being performed for prenatal diagnosis  A brief review of the literature identified a study published in Acta Obstet [facility] Sherie  281;79:343-739, titled " Nuchal translucency of 3 0-3 4mm an indication for NIPT or microarray? Cohort analysis and literature review" by Keena ortiz  This study identifed a 1:7 4 or 13% risk for chromosomal abnormality  69% were common aneuploidy (Trisomy 21, 18 or 13) Residual risk for a chromosomal CNV in light of normal routine NIPT was 4 8%  The patient was also screened for 22q deletion via NIPT therefore I advised her that the remaining risk for a chromosomal CNV is likely to be in the range of 4%  It is difficult to predict how normal Level II ultrasound may further reduce this risk given that many CNV may not have associated physical anomalies  The patient confirmed her decision to proceed with amniocentesis as previously scheduled  She stated that the procedure was previously reviewed with her in detail and she has no further questions at this time  Reviewed that results for microarray take an average 10 days to report  Cautioned patient on opening report in [address] prior to receiving a call from our office with results      Problem List Items Addressed This Visit    None         Recent Visits  Date Type Provider Dept   05/13/22 Telephone [facility], [address]   05/10/22 Telephone [facility]   Showing recent visits within past 7 days and meeting all other requirements  Future Appointments  No visits were found meeting these conditions    Showing future appointments within next 150 days and meeting all other requirements         I spent 10 minutes directly with the patient during this visit

## 2022-05-17 ENCOUNTER — PROCEDURE VISIT (OUTPATIENT)
Dept: PERINATAL CARE | Facility: OTHER | Age: 32
End: 2022-05-17
Payer: COMMERCIAL

## 2022-05-17 VITALS
DIASTOLIC BLOOD PRESSURE: 80 MMHG | WEIGHT: 234.57 LBS | HEIGHT: 67 IN | BODY MASS INDEX: 36.82 KG/M2 | HEART RATE: 92 BPM | SYSTOLIC BLOOD PRESSURE: 135 MMHG

## 2022-05-17 DIAGNOSIS — Z3A.20 20 WEEKS GESTATION OF PREGNANCY: ICD-10-CM

## 2022-05-17 DIAGNOSIS — O28.3 INCREASED NUCHAL TRANSLUCENCY SPACE ON FETAL ULTRASOUND: Primary | ICD-10-CM

## 2022-05-17 DIAGNOSIS — Z91.89 AT RISK FOR ANEUPLOIDY: ICD-10-CM

## 2022-05-17 PROBLEM — Z86.32 HISTORY OF GESTATIONAL DIABETES IN PRIOR PREGNANCY, CURRENTLY PREGNANT IN SECOND TRIMESTER: Status: ACTIVE | Noted: 2018-07-01

## 2022-05-17 PROBLEM — O09.292 HISTORY OF GESTATIONAL DIABETES IN PRIOR PREGNANCY, CURRENTLY PREGNANT IN SECOND TRIMESTER: Status: ACTIVE | Noted: 2018-07-01

## 2022-05-17 PROCEDURE — 76946 ECHO GUIDE FOR AMNIOCENTESIS: CPT | Performed by: OBSTETRICS & GYNECOLOGY

## 2022-05-17 PROCEDURE — 59000 AMNIOCENTESIS DIAGNOSTIC: CPT | Performed by: OBSTETRICS & GYNECOLOGY

## 2022-05-17 NOTE — PROGRESS NOTES
Amniocentesis pre procedure timeout done @ 5686  Verified patient name and   Confirmed procedure to be performed   Reviewed relevant allergies- Sulfa Antibiotics, ragweed, Pollen  Reviewed Maternal Blood type O positive  Reviewed Maternal medication list   Sample labeled with patient name /specimen collection date  Validated correct patient identification on procedure signed consent and specimen label  Patient tolerated procedure without difficulty  Verbally reviewed with patient post amniocentesis instructions including warning symptoms to report ( bleeding  cramping, leaking)   Physician contact information given  Patient was able to verbally teach back instructions  Amniocentesis post procedure:  Physician reviewed specimen labeling  Labcorplink orders placed for   Specimen brought to [facility] lab

## 2022-05-17 NOTE — LETTER
May 17, 2022     Allie King 57  [address]    Patient: Inna Lara   YOB: 1990   Date of Visit: 2022       Dear Dr Garcia Kind: Thank you for referring Hector Esquivel to me for evaluation  Below are my notes for this consultation  If you have questions, please do not hesitate to call me  I look forward to following your patient along with you  Sincerely,        Rochelle Arreola MD        CC: No Recipients  Rochelle Arreola MD  2022  8:51 PM  Sign when Signing Visit  PREPROCEDURE H&P: MATERNAL-FETAL MEDICINE    Crispin Sanders is a 28y o  year-old para  at 20w6d here with chief complaint of preprocedure History and Physical Examination prior to planned office amniocentesis  History of Present Illness as it relates to this planned procedure is hx of a first trimester thickened nuchal translucency that normalized by the second trimester  She has a hx of Normal NIPT        Her other history is as follows:    Past Medical History:   Diagnosis Date    Anxiety     Depression      Past Surgical History:   Procedure Laterality Date    ANTERIOR CRUCIATE LIGAMENT REPAIR      CHOLECYSTECTOMY      INGUINAL HERNIA REPAIR       OB History    Para Term  AB Living   3 2 2     2   SAB IAB Ectopic Multiple Live Births         0 2      # Outcome Date GA Lbr Kiran/2nd Weight Sex Delivery Anes PTL Lv   3 Current            2 Term 18 39w5d / 00:11 4139 g (9 lb 2 oz) M Vag-Spont EPI N KASIA      Birth Comments: GDM   1 Term 03/19/15 40w0d  3685 g (8 lb 2 oz) M Vag-Spont  N KASIA      Birth Comments: GDM     Social History     Tobacco Use   Smoking Status Never Smoker   Smokeless Tobacco Never Used      Social History     Substance and Sexual Activity   Alcohol Use No      Social History     Substance and Sexual Activity   Drug Use No        Current Outpatient Medications:     escitalopram (LEXAPRO) 10 mg tablet, Take 1 tablet (10 mg total) by mouth daily, Disp: 90 tablet, Rfl: 1    Multiple Vitamin (MULTIVITAMIN ADULT PO), multivitamin, Disp: , Rfl:     ondansetron (ZOFRAN-ODT) 8 mg disintegrating tablet, PLACE 1 TABLET TWICE A DAY BY TRANSLINGUAL ROUTE AS DIRECTED FOR 30 DAYS , Disp: , Rfl:   Allergies   Allergen Reactions    Pollen Extract     Short Ragweed Pollen Ext     Sulfa Antibiotics      Review of Systems   All other systems reviewed and are negative  Examination:  Vitals: Blood pressure 135/80, pulse 92, height 5' 7" (1 702 m), weight 106 kg (234 lb 9 1 oz), last menstrual period 2021, currently breastfeeding  Physical Exam  General appearance: alert, well appearing, and in no distress, oriented to person, place, and time and overweight  Head: normal cephalic  Pulmonary exam: clear to auscultation, no wheezes, rales or rhonchi, symmetric air entry  Cardiovascular exam: normal rate, regular rhythm, normal S1, S2, no murmurs, rubs, clicks or gallops  Abdominal exam: soft, nontender, nondistended and gravid  Extremity exam:no pedal edema noted       Relevant lab data:  Blood type: O   Rh type: +        Assessment and Plan: Ms Juan Naranjo is a 28y o  year-old para  here for preoperative History and Physical Examination prior to planned office amniocentesis  Risks, benefits and alternatives to this procedure were reviewed with the patient  Risks specific to the procedure (beyond the standard [facility] procedure consent) that I reviewed with the patient prior to the procedure include:  ruptured membranes leading to infection and pregnancy loss,  birth, vaginal bleeding, pain, and failure to achieve desired results  Today there are no obvious contraindications to planned procedure  Signed informed consent was obtained  Please see documentation of procedure in ultrasound report, to be found under "OB procedures" tab in Epic        Procedural details:  Provider: Dr Malini Blackburn and Dr Chester Benavides Haze Hodgkin   Procedure performed: amniocenteis   Procedure description 22 gauge needle was used to withdrawal 20 cubic centimeters of clear yellow fluid without the need to pass the placenta  Findings:  Clear yellow fluid  Estimated blood loss:  None  Complications:  None  Specimens removed:  20 cubic centimeters of amniotic fluid which was sent for microarray  Maternal blood was also drawn for maternal contamination studies  We did not send for fluorescence insitu since she had normal cell free DNA screening  Post-procedure diagnosis:  Procedure completed secondary to thickened first-trimester nuchal translucency    At the conclusion of today's encounter, all questions were answered to her satisfaction  Thank you very much for this kind referral and please do not hesitate to contact me with any further questions or concerns      Sincerely,    Rochelle Arreola MD  Attending Physician, Mahendra

## 2022-05-17 NOTE — PROGRESS NOTES
PREPROCEDURE H&P: MATERNAL-FETAL MEDICINE    Ganga Quezada is a 28y o  year-old para  at 23w11d here with chief complaint of preprocedure History and Physical Examination prior to planned office amniocentesis  History of Present Illness as it relates to this planned procedure is hx of a first trimester thickened nuchal translucency that normalized by the second trimester  She has a hx of Normal NIPT  Her other history is as follows:    Past Medical History:   Diagnosis Date    Anxiety     Depression      Past Surgical History:   Procedure Laterality Date    ANTERIOR CRUCIATE LIGAMENT REPAIR      CHOLECYSTECTOMY      INGUINAL HERNIA REPAIR       OB History    Para Term  AB Living   3 2 2     2   SAB IAB Ectopic Multiple Live Births         0 2      # Outcome Date GA Lbr Kiran/2nd Weight Sex Delivery Anes PTL Lv   3 Current            2 Term 18 39w5d / 00:11 4139 g (9 lb 2 oz) M Vag-Spont EPI N KASIA      Birth Comments: GDM   1 Term 03/19/15 40w0d  3685 g (8 lb 2 oz) M Vag-Spont  N KASIA      Birth Comments: GDM     Social History     Tobacco Use   Smoking Status Never Smoker   Smokeless Tobacco Never Used      Social History     Substance and Sexual Activity   Alcohol Use No      Social History     Substance and Sexual Activity   Drug Use No        Current Outpatient Medications:     escitalopram (LEXAPRO) 10 mg tablet, Take 1 tablet (10 mg total) by mouth daily, Disp: 90 tablet, Rfl: 1    Multiple Vitamin (MULTIVITAMIN ADULT PO), multivitamin, Disp: , Rfl:     ondansetron (ZOFRAN-ODT) 8 mg disintegrating tablet, PLACE 1 TABLET TWICE A DAY BY TRANSLINGUAL ROUTE AS DIRECTED FOR 30 DAYS , Disp: , Rfl:   Allergies   Allergen Reactions    Pollen Extract     Short Ragweed Pollen Ext     Sulfa Antibiotics      Review of Systems   All other systems reviewed and are negative        Examination:  Vitals: Blood pressure 135/80, pulse 92, height 5' 7" (1 702 m), weight 106 kg (234 lb 9 1 oz), last menstrual period 2021, currently breastfeeding  Physical Exam  General appearance: alert, well appearing, and in no distress, oriented to person, place, and time and overweight  Head: normal cephalic  Pulmonary exam: clear to auscultation, no wheezes, rales or rhonchi, symmetric air entry  Cardiovascular exam: normal rate, regular rhythm, normal S1, S2, no murmurs, rubs, clicks or gallops  Abdominal exam: soft, nontender, nondistended and gravid  Extremity exam:no pedal edema noted       Relevant lab data:  Blood type: O   Rh type: +        Assessment and Plan: Ms Bernard Matos is a 28y o  year-old para  here for preoperative History and Physical Examination prior to planned office amniocentesis  Risks, benefits and alternatives to this procedure were reviewed with the patient  Risks specific to the procedure (beyond the standard [facility] procedure consent) that I reviewed with the patient prior to the procedure include:  ruptured membranes leading to infection and pregnancy loss,  birth, vaginal bleeding, pain, and failure to achieve desired results  Today there are no obvious contraindications to planned procedure  Signed informed consent was obtained  Please see documentation of procedure in ultrasound report, to be found under "OB procedures" tab in Epic  Procedural details:  Provider: Dr Yane Luong and Dr Judit Pearl   Procedure performed: amniocenteis   Procedure description 22 gauge needle was used to withdrawal 20 cubic centimeters of clear yellow fluid without the need to pass the placenta  Findings:  Clear yellow fluid  Estimated blood loss:  None  Complications:  None  Specimens removed:  20 cubic centimeters of amniotic fluid which was sent for microarray with plans to send for No[place]ans if the microarray is normal   Maternal blood was also drawn for maternal contamination studies    We did not send for fluorescence insitu since she had normal cell free DNA screening  Post-procedure diagnosis:  Procedure completed secondary to thickened first-trimester nuchal translucency    At the conclusion of today's encounter, all questions were answered to her satisfaction  Thank you very much for this kind referral and please do not hesitate to contact me with any further questions or concerns      Sincerely,    Libia Rivera MD  Attending Physician, Mahendra

## 2022-05-18 ENCOUNTER — TELEPHONE (OUTPATIENT)
Dept: PERINATAL CARE | Facility: CLINIC | Age: 32
End: 2022-05-18

## 2022-05-18 NOTE — TELEPHONE ENCOUNTER
Received a call from Marielena at [address] [place] [address] about lavender top blood sample that they have with no order  Informed Marielena that on the order sheet in "Cinical Comment" section it   states " Hold Cell, Hold Maternal"  Marielena updated pt's records and no further assistance was needed

## 2022-05-20 ENCOUNTER — TELEPHONE (OUTPATIENT)
Dept: PERINATAL CARE | Facility: CLINIC | Age: 32
End: 2022-05-20

## 2022-05-20 NOTE — TELEPHONE ENCOUNTER
Called patient and confirmed date of birth  Discussed normal FISH results  Patient already aware of fetal sex and confirmed FISH reported as male  Discussed that microarray is pending and will likely be available in 7-10 days  Results may take an additional two weeks if cell culture needed  Patient expressed verbal understanding and had no questions  I encouraged her to call with any concerns

## 2022-05-23 ENCOUNTER — TELEPHONE (OUTPATIENT)
Dept: PERINATAL CARE | Facility: CLINIC | Age: 32
End: 2022-05-23

## 2022-05-23 NOTE — TELEPHONE ENCOUNTER
TC to patient  Discussed with patient the option of adding on Petroleum syndrome testing if microarray reports normal  Briefly discussed the characteristics of Petroleum syndrome  Advised patient that Petroleum syndrome testing is a single gene panel  Patient consents to testing being added on  She reports understanding and no additional questions at this time  TC to Drew Almonte to client rep, Gerhardt Pun  She will inform the genetic counselors that we could like to add on Petroleum syndrome testing if microarray reports normal and will FAX over the form for adding on testing

## 2022-05-24 ENCOUNTER — TELEPHONE (OUTPATIENT)
Dept: PERINATAL CARE | Facility: CLINIC | Age: 32
End: 2022-05-24

## 2022-05-24 NOTE — TELEPHONE ENCOUNTER
Spoke with Ananda Song at [facility] about Concord syndrome add-on  They have culture already in process (for the array), and they already have a maternal blood sample  She will fax over add-on form for us to sign and send back  Form received, signed, and faxed back  "Complete" received

## 2022-06-01 ENCOUNTER — TELEPHONE (OUTPATIENT)
Dept: PERINATAL CARE | Facility: CLINIC | Age: 32
End: 2022-06-01

## 2022-06-01 NOTE — TELEPHONE ENCOUNTER
Telephone call to patient  Confirmed date of birth  Reported normal chromosomal microarray test results from amniocentesis  Patient stated that she had seen results in my chart  Advised patient that culture results will be sent for Speculator syndrome testing  Patient expressed understanding has no further questions at this time  Spoke with GC at [employer], who reported that cells are still growing  Results could take up to 4 weeks  TC back to patient to inform

## 2022-06-06 ENCOUNTER — TELEPHONE (OUTPATIENT)
Dept: PERINATAL CARE | Facility: CLINIC | Age: 32
End: 2022-06-06

## 2022-06-06 NOTE — TELEPHONE ENCOUNTER
[phone] [place] that for last are ready for Rom syndrome testing to be initiated  Results expected in 2-3 weeks

## 2022-06-14 ENCOUNTER — INITIAL PRENATAL (OUTPATIENT)
Dept: OBGYN CLINIC | Facility: MEDICAL CENTER | Age: 32
End: 2022-06-14

## 2022-06-14 VITALS
SYSTOLIC BLOOD PRESSURE: 124 MMHG | WEIGHT: 237 LBS | DIASTOLIC BLOOD PRESSURE: 72 MMHG | BODY MASS INDEX: 35.92 KG/M2 | HEIGHT: 68 IN

## 2022-06-14 DIAGNOSIS — Z3A.24 24 WEEKS GESTATION OF PREGNANCY: Primary | ICD-10-CM

## 2022-06-14 PROCEDURE — NOBC: Performed by: OBSTETRICS & GYNECOLOGY

## 2022-06-14 NOTE — PROGRESS NOTES
[id number]  OB History    Para Term  AB Living   3 2 2     2   SAB IAB Ectopic Multiple Live Births         0 2      # Outcome Date GA Lbr Kiran/2nd Weight Sex Delivery Anes PTL Lv   3 Current            2 Term 18 39w5d / 00:11 4139 g (9 lb 2 oz) M Vag-Spont EPI N KASIA      Birth Comments: GDM      Complications: GDM (gestational diabetes mellitus)   1 Term 03/19/15 40w0d  3685 g (8 lb 2 oz) M Vag-Spont  N KASIA      Birth Comments: GDM      Complications: GDM (gestational diabetes mellitus)         * Pt presents for OB intake  *[id number]  *Pt's LMP was Patient's last menstrual period was 2021  *Ultrasound date:3/22/2022   13weeks 4days  *Estimated date of delivery: 2022   * confirmed by lmp    *Signs/Symptoms of Pregnancy   *no Constipation    *no Headaches   *no Cramping  *no Spotting    *no PICA cravings    Diabetes     *yes Hx of GDM    *yes BMI >35       Hypertension-    *no Hx of chronic HTN    *no Hx of gestational HTN   *no hx of preeclampsia, eclampsia, or HELLP syndrome     *Infection Screening   *no Does the pt have a hx of MRSA? *no History of herpes?      *Immunizations:   *yes Discussed TDaP vaccine   *yes COVID Vaccine *Received       ACTIVE MEDICAL/MENTAL HEALTH CONDITIONS  Depression *yes   Anxiety*yes  Medications*yes-lexapro      *Interview education   *Handouts given:    *Baby and Me support center     *MyChart sign up instructions    *Lab Locations    *[facility] List/Choosing Pediatrician Sheet    *[facility] Parent[facility]    *Schedule for Prenatal Visits    *Pregnancy Warning Signs Reviewed    *Safe Medications During Pregnancy    *SMA and CF Testing information sheet    *CPT Code Sheet/MFM 13week NT pamphlet    *[employer]        SBIRT Screen:  Depression Screening Follow-up Plan: Patient's depression screening was negative with an [place] score of  6        *  [facility]   Has been seeing them throughout the pregnancy     Patient has been informed of basic prenatal advice such as avoiding alcohol, drugs, and smoking  She should remain hydrated and take daily prenatal vitamins  Patient should avoid caffeine, raw sprouts, high mercury fish, undercooked fish, raw eggs, organ meat, unwashed produce, and unpasteurized cheeses, milk, and fruit juice and undercooked meats  She has been informed about toxoplasmosis and to avoid cat feces  *Details that I feel the provider should be aware of: Komal Sarkar was seen for her ob intake at 24w6d  She is a SOLO transfer  Hx of 2 vaginal deliveries complicated by diet controlled gdm  No gtt completed throughout this pregnancy  Pt does test her sugars on a weekly basis to make sure there are no high numbers  Per oncall provider, Mery Soriano to follow up with gtt at 28 week visit  MFM referral previously placed and patient has been seeing them  PN1 visit scheduled for *6/22/2022  The patient was oriented to our practice and all questions were answered      Interviewed by:  Glen Paz

## 2022-06-20 LAB
AFP ADJ MOM AMN: 0.75
AFP AMN-MCNC: 4.8 UG/ML
AFP AMN-MCNC: NORMAL UG/L
ARRAY TYPE: NORMAL
CELLS ANALYZED AMN: 50
CHROM ANALY INTERPHASE AMN FISH-IMP: NORMAL
CHROM ANALY INTERPHASE BLD FISH-IMP: NORMAL
CHROMOSOME BLD/T: NORMAL
DNA SEQ VAR ID: NORMAL
GA (WEEKS): 20 WK
GENOTYPING TARGETS: NORMAL
INTERPRETATION: NORMAL
KARYOTYP AMN: NORMAL
LAB DIRECTOR NAME PROVIDER: NORMAL
SPECIMEN SOURCE: NORMAL
SPECIMEN SOURCE: NORMAL
TOTAL CELLS COUNTED BLD: 50

## 2022-06-22 PROBLEM — O99.210 OBESITY IN PREGNANCY: Status: ACTIVE | Noted: 2022-06-22

## 2022-06-22 PROBLEM — Z3A.26 26 WEEKS GESTATION OF PREGNANCY: Status: ACTIVE | Noted: 2022-06-22

## 2022-06-24 ENCOUNTER — TELEPHONE (OUTPATIENT)
Dept: PERINATAL CARE | Facility: CLINIC | Age: 32
End: 2022-06-24

## 2022-06-24 NOTE — TELEPHONE ENCOUNTER
Telephone call to patient  Confirmed date of birth  Reported prenatal [place] syndrome panel results were negative  Patient expressed understanding and no additional questions at this time

## 2022-06-28 ENCOUNTER — TELEPHONE (OUTPATIENT)
Dept: OBGYN CLINIC | Facility: MEDICAL CENTER | Age: 32
End: 2022-06-28

## 2022-06-28 NOTE — TELEPHONE ENCOUNTER
Called patient regarding missed appointment today  Patient states" I forgot my appointment was today " I rescheduled her appointment for Friday 7/1/2022

## 2022-07-12 ENCOUNTER — TELEPHONE (OUTPATIENT)
Dept: OBGYN CLINIC | Facility: MEDICAL CENTER | Age: 32
End: 2022-07-12

## 2022-09-13 PROBLEM — O09.33 INSUFFICIENT PRENATAL CARE IN THIRD TRIMESTER: Status: ACTIVE | Noted: 2022-09-13

## 2022-09-13 PROBLEM — Z3A.37 37 WEEKS GESTATION OF PREGNANCY: Status: ACTIVE | Noted: 2022-06-22

## 2022-09-13 PROBLEM — O09.293 HISTORY OF GESTATIONAL DIABETES IN PRIOR PREGNANCY, CURRENTLY PREGNANT IN THIRD TRIMESTER: Status: ACTIVE | Noted: 2018-07-01

## 2022-09-13 NOTE — PROGRESS NOTES
Prenatal H&P last seen for OB intake with RN on 22       Denies loss of fluid, vaginal discharge, vaginal bleeding  and abdominal pain  Confirms frequent fetal movement  Tolerating PNV well  Gonorrhea/chlamydia reviewed  Has met with Maternal Fetal Medicine multiple times  Had increased nuchal translucency  Had additional testing SNP microarray and FISH- male infant no chromosomal abnormalities noted  [facility] at  [facility] for am[facility]ocentesis  Did not follow-up with fetal echo or growth scans as recommended  Patient states she has been lost to care because she did not have insurance  coverage    OB history:     G1-  3/19/15 term  male 8lb 2oz; complicated by GDM     G2-  18 term  male 9lb 2 oz; complicated by GDM      G3- current     Dating:  Working NANC[employer] 22 by LMP MFM     Surgical history:     ACL repair (left) , gallbladder removal and hernia repair    Medical History:     Anxiety, depression, history of gestational diabetes, obesity and varicella    Social history:     Alcohol/ tobacco/ illicit drug -denies x3     Denies history of STD/STI      Patient Plans   - Feeding-breast feeding  - Contraception COCP  - Pediatrician [facility]    Plan:  - Continue prenatal vitamin daily  -reviewed fetal kick counts, encouraged daily and written information provided  - Unit regimen reviewed visit weekly until delivery  -referral placed for MFM - history of LGA, did not complete fetal echo,history of suspicious testing (increased nuchal translucency in this pregnancy) and history of GDM x2  - GBS collected-no penicillin allergy  -prenatal panel with reflex anemia panel and 1 hour glucose  Ordered  Blood type is O positive does not need RhoGAM   Patient encouraged to have labs drawn as soon as possible  History of gestational diabetes, LGA and is currently 38 weeks gestation  -28 week folder provided and reviewed    Delivery consent completed today  -  Common discomforts of pregnancy and precautions reviewed  Signs and symptoms to report reviewed  Encouraged social distancing, good hand hygiene, masking, avoiding crowds and written information provided about COVID-19    RTO 1 week with physician f/u labs, US

## 2022-09-14 ENCOUNTER — INITIAL PRENATAL (OUTPATIENT)
Dept: OBGYN CLINIC | Facility: CLINIC | Age: 32
End: 2022-09-14

## 2022-09-14 VITALS
DIASTOLIC BLOOD PRESSURE: 70 MMHG | WEIGHT: 228 LBS | HEIGHT: 67 IN | SYSTOLIC BLOOD PRESSURE: 134 MMHG | BODY MASS INDEX: 35.79 KG/M2

## 2022-09-14 DIAGNOSIS — O09.33 INSUFFICIENT PRENATAL CARE IN THIRD TRIMESTER: ICD-10-CM

## 2022-09-14 DIAGNOSIS — O09.893 NON-COMPLIANT PREGNANT PATIENT, THIRD TRIMESTER: ICD-10-CM

## 2022-09-14 DIAGNOSIS — O09.293 HISTORY OF GESTATIONAL DIABETES IN PRIOR PREGNANCY, CURRENTLY PREGNANT IN THIRD TRIMESTER: Primary | ICD-10-CM

## 2022-09-14 DIAGNOSIS — Z86.32 HISTORY OF GESTATIONAL DIABETES IN PRIOR PREGNANCY, CURRENTLY PREGNANT IN THIRD TRIMESTER: Primary | ICD-10-CM

## 2022-09-14 DIAGNOSIS — Z3A.37 37 WEEKS GESTATION OF PREGNANCY: ICD-10-CM

## 2022-09-14 DIAGNOSIS — O28.3 INCREASED NUCHAL TRANSLUCENCY SPACE ON FETAL ULTRASOUND: ICD-10-CM

## 2022-09-14 DIAGNOSIS — O99.210 OBESITY IN PREGNANCY: ICD-10-CM

## 2022-09-14 DIAGNOSIS — Z91.199 NON-COMPLIANT PREGNANT PATIENT, THIRD TRIMESTER: ICD-10-CM

## 2022-09-14 PROCEDURE — PNV: Performed by: NURSE PRACTITIONER

## 2022-09-14 PROCEDURE — 87150 DNA/RNA AMPLIFIED PROBE: CPT | Performed by: NURSE PRACTITIONER

## 2022-09-16 LAB — GP B STREP DNA SPEC QL NAA+PROBE: POSITIVE

## 2022-09-19 ENCOUNTER — NURSE TRIAGE (OUTPATIENT)
Dept: OTHER | Facility: OTHER | Age: 32
End: 2022-09-19

## 2022-09-19 ENCOUNTER — HOSPITAL ENCOUNTER (INPATIENT)
Facility: HOSPITAL | Age: 32
LOS: 1 days | Discharge: HOME | End: 2022-09-21
Attending: OBSTETRICS & GYNECOLOGY | Admitting: OBSTETRICS & GYNECOLOGY
Payer: COMMERCIAL

## 2022-09-20 ENCOUNTER — ANESTHESIA EVENT (INPATIENT)
Dept: ANESTHESIOLOGY | Facility: HOSPITAL | Age: 32
End: 2022-09-20
Payer: COMMERCIAL

## 2022-09-20 ENCOUNTER — ANESTHESIA (INPATIENT)
Dept: ANESTHESIOLOGY | Facility: HOSPITAL | Age: 32
End: 2022-09-20
Payer: COMMERCIAL

## 2022-09-20 PROBLEM — Z3A.38 38 WEEKS GESTATION OF PREGNANCY: Status: ACTIVE | Noted: 2022-09-20

## 2022-09-20 PROBLEM — O13.9 GESTATIONAL HYPERTENSION: Status: ACTIVE | Noted: 2022-09-20

## 2022-09-20 PROBLEM — O28.3 INCREASED NUCHAL TRANSLUCENCY SPACE ON FETAL ULTRASOUND: Status: RESOLVED | Noted: 2022-05-13 | Resolved: 2022-09-20

## 2022-09-20 LAB
ABO GROUP BLD: NORMAL
ALBUMIN SERPL BCP-MCNC: 2.4 G/DL (ref 3.5–5)
ALP SERPL-CCNC: 248 U/L (ref 46–116)
ALT SERPL W P-5'-P-CCNC: 14 U/L (ref 12–78)
AMPHETAMINES SERPL QL SCN: NEGATIVE
ANION GAP SERPL CALCULATED.3IONS-SCNC: 12 MMOL/L (ref 4–13)
AST SERPL W P-5'-P-CCNC: 9 U/L (ref 5–45)
BARBITURATES UR QL: NEGATIVE
BASE EXCESS BLDCOA CALC-SCNC: 1.1 MMOL/L (ref 3–11)
BASE EXCESS BLDCOV CALC-SCNC: -0.3 MMOL/L (ref 1–9)
BASOPHILS # BLD AUTO: 0.01 THOUSANDS/ΜL (ref 0–0.1)
BASOPHILS NFR BLD AUTO: 0 % (ref 0–1)
BENZODIAZ UR QL: NEGATIVE
BILIRUB SERPL-MCNC: 0.33 MG/DL (ref 0.2–1)
BILIRUB UR QL STRIP: NEGATIVE
BLD GP AB SCN SERPL QL: NEGATIVE
BUN SERPL-MCNC: 10 MG/DL (ref 5–25)
CALCIUM ALBUM COR SERPL-MCNC: 10.3 MG/DL (ref 8.3–10.1)
CALCIUM SERPL-MCNC: 9 MG/DL (ref 8.3–10.1)
CHLORIDE SERPL-SCNC: 103 MMOL/L (ref 96–108)
CLARITY UR: CLEAR
CO2 SERPL-SCNC: 24 MMOL/L (ref 21–32)
COCAINE UR QL: NEGATIVE
COLOR UR: YELLOW
CREAT SERPL-MCNC: 0.44 MG/DL (ref 0.6–1.3)
CREAT UR-MCNC: 19.7 MG/DL
EOSINOPHIL # BLD AUTO: 0.08 THOUSAND/ΜL (ref 0–0.61)
EOSINOPHIL NFR BLD AUTO: 1 % (ref 0–6)
ERYTHROCYTE [DISTWIDTH] IN BLOOD BY AUTOMATED COUNT: 14.1 % (ref 11.6–15.1)
GFR SERPL CREATININE-BSD FRML MDRD: 133 ML/MIN/1.73SQ M
GLUCOSE SERPL-MCNC: 105 MG/DL (ref 65–140)
GLUCOSE SERPL-MCNC: 107 MG/DL (ref 65–140)
GLUCOSE SERPL-MCNC: 116 MG/DL (ref 65–140)
GLUCOSE SERPL-MCNC: 117 MG/DL (ref 65–140)
GLUCOSE SERPL-MCNC: 129 MG/DL (ref 65–140)
GLUCOSE SERPL-MCNC: 91 MG/DL (ref 65–140)
GLUCOSE UR STRIP-MCNC: NEGATIVE MG/DL
HBV SURFACE AG SER QL: NORMAL
HCO3 BLDCOA-SCNC: 27.8 MMOL/L (ref 17.3–27.3)
HCO3 BLDCOV-SCNC: 24.5 MMOL/L (ref 12.2–28.6)
HCT VFR BLD AUTO: 34.6 % (ref 34.8–46.1)
HGB BLD-MCNC: 11.7 G/DL (ref 11.5–15.4)
HGB UR QL STRIP.AUTO: NEGATIVE
HIV 1+2 AB+HIV1 P24 AG SERPL QL IA: NORMAL
IMM GRANULOCYTES # BLD AUTO: 0.02 THOUSAND/UL (ref 0–0.2)
IMM GRANULOCYTES NFR BLD AUTO: 0 % (ref 0–2)
KETONES UR STRIP-MCNC: NEGATIVE MG/DL
LEUKOCYTE ESTERASE UR QL STRIP: NEGATIVE
LYMPHOCYTES # BLD AUTO: 1.39 THOUSANDS/ΜL (ref 0.6–4.47)
LYMPHOCYTES NFR BLD AUTO: 20 % (ref 14–44)
MCH RBC QN AUTO: 29.8 PG (ref 26.8–34.3)
MCHC RBC AUTO-ENTMCNC: 33.8 G/DL (ref 31.4–37.4)
MCV RBC AUTO: 88 FL (ref 82–98)
METHADONE UR QL: NEGATIVE
MONOCYTES # BLD AUTO: 0.74 THOUSAND/ΜL (ref 0.17–1.22)
MONOCYTES NFR BLD AUTO: 11 % (ref 4–12)
NEUTROPHILS # BLD AUTO: 4.72 THOUSANDS/ΜL (ref 1.85–7.62)
NEUTS SEG NFR BLD AUTO: 68 % (ref 43–75)
NITRITE UR QL STRIP: NEGATIVE
NRBC BLD AUTO-RTO: 0 /100 WBCS
O2 CT VFR BLDCOA CALC: 8.9 ML/DL
OPIATES UR QL SCN: NEGATIVE
OXYCODONE+OXYMORPHONE UR QL SCN: NEGATIVE
OXYHGB MFR BLDCOA: 41.3 %
OXYHGB MFR BLDCOV: 69.6 %
PCO2 BLDCOA: 52.2 MM[HG] (ref 30–60)
PCO2 BLDCOV: 40.4 MM HG (ref 27–43)
PCP UR QL: NEGATIVE
PH BLDCOA: 7.34 [PH] (ref 7.23–7.43)
PH BLDCOV: 7.4 [PH] (ref 7.19–7.49)
PH UR STRIP.AUTO: 6.5 [PH]
PLATELET # BLD AUTO: 293 THOUSANDS/UL (ref 149–390)
PMV BLD AUTO: 10.3 FL (ref 8.9–12.7)
PO2 BLDCOA: 19.6 MM HG (ref 5–25)
PO2 BLDCOV: 28.9 MM HG (ref 15–45)
POTASSIUM SERPL-SCNC: 3.6 MMOL/L (ref 3.5–5.3)
PROT SERPL-MCNC: 6.9 G/DL (ref 6.4–8.4)
PROT UR STRIP-MCNC: NEGATIVE MG/DL
PROT UR-MCNC: <6 MG/DL
PROT/CREAT UR: <0.3 MG/G{CREAT} (ref 0–0.1)
RBC # BLD AUTO: 3.93 MILLION/UL (ref 3.81–5.12)
RH BLD: POSITIVE
RPR SER QL: NORMAL
RUBV IGG SERPL IA-ACNC: 15.1 IU/ML
SAO2 % BLDCOV: 15.1 ML/DL
SODIUM SERPL-SCNC: 139 MMOL/L (ref 135–147)
SP GR UR STRIP.AUTO: 1.01 (ref 1–1.03)
SPECIMEN EXPIRATION DATE: NORMAL
THC UR QL: NEGATIVE
UROBILINOGEN UR QL STRIP.AUTO: 0.2 E.U./DL
WBC # BLD AUTO: 6.96 THOUSAND/UL (ref 4.31–10.16)

## 2022-09-20 PROCEDURE — NC001 PR NO CHARGE: Performed by: OBSTETRICS & GYNECOLOGY

## 2022-09-20 PROCEDURE — 87086 URINE CULTURE/COLONY COUNT: CPT

## 2022-09-20 PROCEDURE — 81003 URINALYSIS AUTO W/O SCOPE: CPT

## 2022-09-20 PROCEDURE — 99202 OFFICE O/P NEW SF 15 MIN: CPT

## 2022-09-20 PROCEDURE — 80081 OBSTETRIC PANEL INC HIV TSTG: CPT

## 2022-09-20 PROCEDURE — 84156 ASSAY OF PROTEIN URINE: CPT

## 2022-09-20 PROCEDURE — 80307 DRUG TEST PRSMV CHEM ANLYZR: CPT

## 2022-09-20 PROCEDURE — 82570 ASSAY OF URINE CREATININE: CPT

## 2022-09-20 PROCEDURE — 82805 BLOOD GASES W/O2 SATURATION: CPT | Performed by: OBSTETRICS & GYNECOLOGY

## 2022-09-20 PROCEDURE — 80053 COMPREHEN METABOLIC PANEL: CPT

## 2022-09-20 PROCEDURE — 82948 REAGENT STRIP/BLOOD GLUCOSE: CPT

## 2022-09-20 PROCEDURE — 59410 OBSTETRICAL CARE: CPT | Performed by: OBSTETRICS & GYNECOLOGY

## 2022-09-20 PROCEDURE — 4A1HXCZ MONITORING OF PRODUCTS OF CONCEPTION, CARDIAC RATE, EXTERNAL APPROACH: ICD-10-PCS | Performed by: OBSTETRICS & GYNECOLOGY

## 2022-09-20 RX ORDER — ROPIVACAINE HYDROCHLORIDE 2 MG/ML
INJECTION, SOLUTION EPIDURAL; INFILTRATION; PERINEURAL CONTINUOUS PRN
Status: DISCONTINUED | OUTPATIENT
Start: 2022-09-20 | End: 2022-09-20 | Stop reason: HOSPADM

## 2022-09-20 RX ORDER — CALCIUM CARBONATE 200(500)MG
1000 TABLET,CHEWABLE ORAL DAILY PRN
Status: DISCONTINUED | OUTPATIENT
Start: 2022-09-20 | End: 2022-09-21 | Stop reason: HOSPADM

## 2022-09-20 RX ORDER — ESCITALOPRAM OXALATE 10 MG/1
10 TABLET ORAL DAILY
Status: DISCONTINUED | OUTPATIENT
Start: 2022-09-21 | End: 2022-09-21 | Stop reason: HOSPADM

## 2022-09-20 RX ORDER — OXYTOCIN/RINGER'S LACTATE 30/500 ML
PLASTIC BAG, INJECTION (ML) INTRAVENOUS
Status: COMPLETED
Start: 2022-09-20 | End: 2022-09-20

## 2022-09-20 RX ORDER — ACETAMINOPHEN 325 MG/1
650 TABLET ORAL EVERY 4 HOURS PRN
Status: DISCONTINUED | OUTPATIENT
Start: 2022-09-20 | End: 2022-09-21 | Stop reason: HOSPADM

## 2022-09-20 RX ORDER — ESCITALOPRAM OXALATE 10 MG/1
10 TABLET ORAL DAILY
Status: DISCONTINUED | OUTPATIENT
Start: 2022-09-20 | End: 2022-09-20

## 2022-09-20 RX ORDER — IBUPROFEN 600 MG/1
600 TABLET ORAL EVERY 6 HOURS
Status: DISCONTINUED | OUTPATIENT
Start: 2022-09-20 | End: 2022-09-21 | Stop reason: HOSPADM

## 2022-09-20 RX ORDER — SIMETHICONE 80 MG
80 TABLET,CHEWABLE ORAL 4 TIMES DAILY PRN
Status: DISCONTINUED | OUTPATIENT
Start: 2022-09-20 | End: 2022-09-21 | Stop reason: HOSPADM

## 2022-09-20 RX ORDER — ONDANSETRON 2 MG/ML
4 INJECTION INTRAMUSCULAR; INTRAVENOUS EVERY 4 HOURS PRN
Status: DISCONTINUED | OUTPATIENT
Start: 2022-09-20 | End: 2022-09-20

## 2022-09-20 RX ORDER — DIPHENHYDRAMINE HCL 25 MG
25 TABLET ORAL EVERY 6 HOURS PRN
Status: DISCONTINUED | OUTPATIENT
Start: 2022-09-20 | End: 2022-09-21 | Stop reason: HOSPADM

## 2022-09-20 RX ORDER — DIAPER,BRIEF,INFANT-TODD,DISP
1 EACH MISCELLANEOUS DAILY PRN
Status: DISCONTINUED | OUTPATIENT
Start: 2022-09-20 | End: 2022-09-21 | Stop reason: HOSPADM

## 2022-09-20 RX ORDER — DOCUSATE SODIUM 100 MG/1
100 CAPSULE, LIQUID FILLED ORAL 2 TIMES DAILY
Status: DISCONTINUED | OUTPATIENT
Start: 2022-09-20 | End: 2022-09-21 | Stop reason: HOSPADM

## 2022-09-20 RX ORDER — ONDANSETRON 2 MG/ML
4 INJECTION INTRAMUSCULAR; INTRAVENOUS EVERY 8 HOURS PRN
Status: DISCONTINUED | OUTPATIENT
Start: 2022-09-20 | End: 2022-09-21 | Stop reason: HOSPADM

## 2022-09-20 RX ORDER — ROPIVACAINE HYDROCHLORIDE 2 MG/ML
INJECTION, SOLUTION EPIDURAL; INFILTRATION; PERINEURAL AS NEEDED
Status: DISCONTINUED | OUTPATIENT
Start: 2022-09-20 | End: 2022-09-20 | Stop reason: HOSPADM

## 2022-09-20 RX ORDER — SODIUM CHLORIDE, SODIUM LACTATE, POTASSIUM CHLORIDE, CALCIUM CHLORIDE 600; 310; 30; 20 MG/100ML; MG/100ML; MG/100ML; MG/100ML
125 INJECTION, SOLUTION INTRAVENOUS CONTINUOUS
Status: DISCONTINUED | OUTPATIENT
Start: 2022-09-20 | End: 2022-09-20

## 2022-09-20 RX ADMIN — SODIUM CHLORIDE, SODIUM LACTATE, POTASSIUM CHLORIDE, AND CALCIUM CHLORIDE 999 ML/HR: .6; .31; .03; .02 INJECTION, SOLUTION INTRAVENOUS at 00:44

## 2022-09-20 RX ADMIN — Medication: at 06:54

## 2022-09-20 RX ADMIN — SODIUM CHLORIDE 5 MILLION UNITS: 0.9 INJECTION, SOLUTION INTRAVENOUS at 01:07

## 2022-09-20 RX ADMIN — ROPIVACAINE HYDROCHLORIDE 8 ML: 2 INJECTION, SOLUTION EPIDURAL; INFILTRATION; PERINEURAL at 01:50

## 2022-09-20 RX ADMIN — DOCUSATE SODIUM 100 MG: 100 CAPSULE, LIQUID FILLED ORAL at 17:15

## 2022-09-20 RX ADMIN — ROPIVACAINE HYDROCHLORIDE 8 ML/HR: 2 INJECTION, SOLUTION EPIDURAL; INFILTRATION; PERINEURAL at 01:55

## 2022-09-20 RX ADMIN — SODIUM CHLORIDE 2.5 MILLION UNITS: 9 INJECTION, SOLUTION INTRAVENOUS at 05:20

## 2022-09-20 RX ADMIN — ROPIVACAINE HYDROCHLORIDE: 2 INJECTION, SOLUTION EPIDURAL; INFILTRATION at 02:03

## 2022-09-20 RX ADMIN — IBUPROFEN 600 MG: 600 TABLET ORAL at 11:12

## 2022-09-20 RX ADMIN — IBUPROFEN 600 MG: 600 TABLET ORAL at 17:15

## 2022-09-20 RX ADMIN — BENZOCAINE AND LEVOMENTHOL 1 APPLICATION: 200; 5 SPRAY TOPICAL at 11:12

## 2022-09-20 NOTE — ANESTHESIA PROCEDURE NOTES
Epidural Block    Patient location during procedure: OB  Start time: 9/20/2022 1:48 AM  Reason for block: at surgeon's request and primary anesthetic  Staffing  Performed: Anesthesiologist   Anesthesiologist: Addison Moore MD  Preanesthetic Checklist  Completed: patient identified, IV checked, site marked, risks and benefits discussed, surgical consent, monitors and equipment checked, pre-op evaluation and timeout performed  Epidural  Patient position: sitting  Prep: Betadine  Patient monitoring: frequent blood pressure checks  Approach: midline  Location: lumbar  Injection technique: CADEN saline  Needle  Needle type: Tuohy   Needle gauge: 18 G  Catheter type: side hole  Catheter size: 20 G  Catheter securement method: clear occlusive dressing  Test dose: negative  Assessment  Sensory level: T10  Number of attempts: 1negative aspiration for CSF, negative aspiration for heme and no paresthesia on injection  patient tolerated the procedure well with no immediate complications

## 2022-09-20 NOTE — DISCHARGE SUMMARY
Discharge Summary - Jhonny Lara 28 y o  female MRN: [id number]    Unit/Bed#: L&D 320-01 Encounter: [id number]    Admission Date: 2022     Discharge Date: ***    Admitting Attending: Jazmin Botello  Delivering Attending: Jazmin Botello  Discharge Attending: ***    Pre-delivery diagnosis:   1  Pregnancy at 38 weeks  2  ROBINA  3  Limited PNC  4  Increased nuchal translucency with normal FISH    Post-delivery diagnosis:  1  Same, delivered    Procedures: Spontaneous Vaginal Delivery    Complications: none apparent    Hospital Course:   29 yo  at 38w6d who presented to L&D for SROM  She received an epidural for pain control  She continued to make change to completely dilated and began pushing  She delivered via vaginal delivery with no lacerations  She had an uncomplicated postpartum course  Condition at discharge: good     On day of discharge, patient was tolerating PO, passing flatus, urinating, and ambulating  Her pain was well controlled with oral analgesics  She was discharged home on postpartum day #*** with standard post partum instructions to follow up with her physician in 3-6 weeks for a postpartum appointment  Discharge instructions/Information to patient and family:   - Do not place anything (no partner, tampons or douche) in your vagina for 6 weeks  -You may walk for exercise for the first 6 weeks then gradually return to your usual activities    -Please do not drive for 1 week if you have no stitches and for 2 weeks if you have stitches or underwent a  delivery     -You may take baths or shower per your preference    -Please look at your bust (breasts) in the mirror daily and call for redness or tenderness or increased warmth    -Please call us for temperature > 100 4*F or 38* C, worsening pain or a foul discharge  Discharge Medications:   Prenatal vitamin daily for 6 months or the duration of nursing whichever is longer    Motrin 600 mg orally every 6 hours as needed for pain  Tylenol (over the counter) per bottle directions as needed for pain: do NOT use with percocet  Hydrocortisone cream 1% (over the counter) applied 1-2x daily to hemorrhoids as needed    Provisions for Follow-Up Care: Follow up with your doctor in *** weeks for postpartum care       Planned Readmission: No    ***

## 2022-09-20 NOTE — ASSESSMENT & PLAN NOTE
Admit  Follow up CBC, RPR, Blood Type  Method of contraception: undecided   GBS postive status: PCN for prophylaxis   Analgesia and/or epidural at patient request  Anticipate  51M with no significant past medical history who presented for fever and abdominal pain, with suspected new diagnosis of gastrointestinal cancer, per CT scan #### 51M with no significant past medical history who presented for fever and abdominal pain, with suspected new diagnosis of gastrointestinal cancer.    Pt had CT scan on 5/29 which showed Suspect mid/distal transverse colon with hepatic metastasis and lymphadenopathy as described. Infection is considered less likely. Focal outpouching of air in communication with the lumen in this portion, which may be due to intradiverticular air, intraluminal air or fistulization into the lymph node. No intraperitoneal free air or organized fluid collection. Somewhat nodular thickening of the left adrenal gland. Metastasis cannot be excluded. Focal areas of decreased or striated nephrogram in the right kidney. Recommend clinical correlation to assess urinary tract infection/pyelonephritis. Prominent distal gastric wall, which may be due to partial distention and/or gastritis. Recommend clinical correlation. Follow-up endoscopy may be obtained for further evaluation. Nonspecific small lucency in the right femoral neck. Differential diagnosis includes focal osteopenia and osseous metastasis. Recommend comparison to previous outside study. Follow-up (bone scan and/or MRI) may be obtained for further evaluation. Indeterminate left lower lobe nodule. Pulmonary metastasis cannot be excluded. Recommend comparison to previous outside study. Follow-up (nonemergent chest CT and/or PET-CT) may be obtained for further evaluation.    CT chest 5/31 for staging which showed 1.  The bilateral subcentimeter pulmonary nodules measuring up to 6 mm, are concerning for metastatic disease. 2.  Enlarged left internal mammary lymph node and cardiophrenic angle lymph nodes are concerning for malignancy. 3.  No change in the hypodense hepatic lesions better evaluated on CT abdomen/pelvis from 5/29/2021 that were concerning for metastatic disease.    6/3 Colonoscopy Likely malignant circumferential (involving more than two-thirds of the lumen circumferencec) obstructing (incomplete) mass in the transverse colon. Biopsied.   Tattooed distally. One 15 mm polyp in the descending colon, removed with a hot snare. Resected and retrieved.  Endoscopy Normal esophagus. Z-line regular, 40 cm from the incisors. Normal stomach. Normal examined duodenum. No specimens collected.    6/4 Pt went to OR for Laparoscopic extended left hemicolectomy.    51M with no significant past medical history who presented for fever and abdominal pain, with suspected new diagnosis of gastrointestinal cancer.    Pt had CT scan on 5/29 which showed Suspect mid/distal transverse colon with hepatic metastasis and lymphadenopathy as described. Infection is considered less likely. Focal outpouching of air in communication with the lumen in this portion, which may be due to intradiverticular air, intraluminal air or fistulization into the lymph node. No intraperitoneal free air or organized fluid collection. Somewhat nodular thickening of the left adrenal gland. Metastasis cannot be excluded. Focal areas of decreased or striated nephrogram in the right kidney. Recommend clinical correlation to assess urinary tract infection/pyelonephritis. Prominent distal gastric wall, which may be due to partial distention and/or gastritis. Recommend clinical correlation. Follow-up endoscopy may be obtained for further evaluation. Nonspecific small lucency in the right femoral neck. Differential diagnosis includes focal osteopenia and osseous metastasis. Recommend comparison to previous outside study. Follow-up (bone scan and/or MRI) may be obtained for further evaluation. Indeterminate left lower lobe nodule. Pulmonary metastasis cannot be excluded. Recommend comparison to previous outside study. Follow-up (nonemergent chest CT and/or PET-CT) may be obtained for further evaluation.    CT chest 5/31 for staging which showed 1.  The bilateral subcentimeter pulmonary nodules measuring up to 6 mm, are concerning for metastatic disease. 2.  Enlarged left internal mammary lymph node and cardiophrenic angle lymph nodes are concerning for malignancy. 3.  No change in the hypodense hepatic lesions better evaluated on CT abdomen/pelvis from 5/29/2021 that were concerning for metastatic disease.    6/3 Colonoscopy Likely malignant circumferential (involving more than two-thirds of the lumen circumferencec) obstructing (incomplete) mass in the transverse colon. Biopsied.   Tattooed distally. One 15 mm polyp in the descending colon, removed with a hot snare. Resected and retrieved.  Endoscopy Normal esophagus. Z-line regular, 40 cm from the incisors. Normal stomach. Normal examined duodenum. No specimens collected.    6/4 Pt went to OR for Laparoscopic extended left hemicolectomy.     During hospital course patients diet was slowly advanced as tolerated.    At this time, pt is tolerating a regular diet, ambulating and voiding.  Pt has been deemed stable for discharge at this time.

## 2022-09-20 NOTE — LACTATION NOTE
This note was copied from a baby's chart  CONSULT - LACTATION  Baby Boy Devora Garcia Sinatore 0 days male MRN: [id number]    [address] [facility] NURSERY Room / Bed: L&D 320(N)/L&D 320(N) Encounter: [id number]    Maternal Information     MOTHER:  Fer Marmolejo  Maternal Age: 28 y o    OB History: # 1 - Date: 03/19/15, Sex: Male, Weight: 3685 g (8 lb 2 oz), GA: 40w0d, Delivery: Vaginal, Spontaneous, Apgar1: None, Apgar5: None, Living: Living, Birth Comments: GDM    # 2 - Date: 18, Sex: Male, Weight: 4139 g (9 lb 2 oz), GA: 39w5d, Delivery: Vaginal, Spontaneous, Apgar1: 8, Apgar5: 9, Living: Living, Birth Comments: GDM    # 3 - Date: 22, Sex: Male, Weight: 3375 g (7 lb 7 1 oz), GA: 38w6d, Delivery: Vaginal, Spontaneous, Apgar1: 8, Apgar5: 9, Living: Living, Birth Comments: None   Previouse breast reduction surgery?  No    Lactation history:   Has patient previously breast fed: Yes   How long had patient previously breast fed: 3-5 months   Previous breast feeding complications: Breast/nipple pain, Other (Comment) (over supply & Mastitis)     Past Surgical History:   Procedure Laterality Date    ANTERIOR CRUCIATE LIGAMENT REPAIR Left     CHOLECYSTECTOMY  2019    INGUINAL HERNIA REPAIR          Birth information:  YOB: 2022   Time of birth: 6:52 AM   Sex: male   Delivery type: Vaginal, Spontaneous   Birth Weight: 3375 g (7 lb 7 1 oz)   Percent of Weight Change: 0%     Gestational Age: 38w7d   [unfilled]    Assessment     Breast and nipple assessment: large nipples     Assessment: normal assessment    Feeding assessment: feeding well with assistance    LATCH:  Latch: Grasps breast, tongue down, lips flanged, rhythmic sucking   Audible Swallowing: A few with stimulation   Type of Nipple: Everted (After stimulation)   Comfort (Breast/Nipple): Filling, red/small blisters/bruises, mild/moderate discomfort   Hold (Positioning): Partial assist, teach one side, mother does other, staff holds   [facility] Score: 7          Feeding recommendations:  breast feed on demand     Met with mother  Provided  with Ready, Set, Baby booklet which contained information on:  Hand expression with access to QR codes to review hand expression  Positioning and latch reviewed as well as showing images of other feeding positions  Discussed the properties of a good latch in any position  Mom wanted to start on left using cradle hold  Encouraged cross cradle to help maintain deep latch  Helped guide to deeper latch  Stimulated to suck converting to rocker suckling  Mom noted less discomfort and better suckling  When baby stopped for a break mom wanted to stop feed  Encouraged her to keep "bugging" him till her refused breast  Baby nursed well till asleep at breast, Encouraged her to burp baby and attempt second breast  Baby suckled even long on right breast using football hold  Feeding on cue and what that means for recognizing infant's hunger, s/s that baby is getting enough milk and some s/s that breastfeeding dyad may need further help Family support at bedside  Skin to Skin contact an benefits to mom and baby  Avoidance of pacifiers for the first month discussed  Gave information on common concerns, what to expect the first few weeks after delivery, preparing for other caregivers, and how partners can help  Resources for support also provided  Met with mother to go over discharge breastfeeding booklet including the feeding log  Emphasized 8 or more (12) feedings in a 24 hour period, what to expect for the number of diapers per day of life and the progression of properties of the  stooling pattern  Reviewed breastfeeding and your lifestyle, storage and preparation of breast milk, how to keep you breast pump clean, the employed breastfeeding mother and paced bottle feeding handouts       Booklet included Breastfeeding Resources for after discharge including access to the number for the [address]  [employer] [facility]  to call for assistance, questions and concerns  Extension number for inpatient lactation support provided                            Tatiana Azar RN 9/20/2022 2:13 PM

## 2022-09-20 NOTE — ASSESSMENT & PLAN NOTE
Prenatal labs ordered on admission   UDS ordered  A2GDM blood sugar check protocol in the setting of no 1hr GTT

## 2022-09-20 NOTE — OB LABOR/OXYTOCIN SAFETY PROGRESS
Labor Progress Note - Bere Lara 28 y o  female MRN: [id number]    Unit/Bed#: L&D 320-01 Encounter: [id number]       Contraction Frequency (minutes): occassional  Contraction Quality: Moderate  Tachysystole: No   Cervical Dilation: 6        Cervical Effacement: 80  Fetal Station: 0  Baseline Rate: 130 bpm  Fetal Heart Rate: 130 BPM  FHR Category: Category I           Vital Signs:   Vitals:    09/20/22 0612   BP: 135/68   Pulse: 89   Resp:    Temp:        Notes/comments:   Pt is resting comfortably  SVE as above  FHT cat 1 felipe q4 per patient, difficult to  on toco, will continue expectant management at this time      Dr Nathalie Monroe aware     Matthias Edwards MD 9/20/2022 6:16 AM

## 2022-09-20 NOTE — L&D DELIVERY NOTE
Delivery Summary - OB/GYN   Windy Lara 28 y o  female MRN: [id number]  Unit/Bed#: L&D 320-01 Encounter: [id number]    Pre-delivery Diagnosis:   1  38w6d pregnancy  2  Insufficient prenatal care  3  Gestational hypertension in labor  4  Elevated BG in labor, patient refuses insulin  5  GBS positive    Post-delivery Diagnosis: same, delivered    Attending: Pramod Laurent MD    Assistant(s): No qualified resident available    Procedure:     Anesthesia: epidural    Quantified Blood Loss:  50 mL    Specimens:   1  Arterial and venous cord gases  2  Cord blood  3  Segment of umbilical cord  4  Placenta to storage     Complications:  None apparent    Findings:  1  Viable male  delivered on 22 at 0652 weight pending;  Apgar scores of 8 at one minute and 9 at five minutes  2  Spontaneous delivery of placenta with centrally inserted 3-vessel cord  3  No laceration       Disposition: Patient tolerated the procedure well and was recovering in labor and delivery room with family and  before being transferred to the post-partum floor  Procedure Details     Description of procedure    After pushing for 2 minutes, on 22 at 0652 patient delivered a viable male , weight pending, Apgars of 8 (1 min) and 9 (5 min)  The fetal vertex delivered spontaneously  There was no nuchal cord  The anterior shoulder delivered atraumatically with maternal expulsive forces and the assistance of downward traction  The posterior shoulder delivered with maternal expulsive forces and the assistance of upward traction  The remainder of the fetus delivered spontaneously  Upon delivery, the infant was placed on the mothers abdomen and the cord was clamped and cut  Delayed cord clamping was achieved  The infant was noted to cry spontaneously and was moving all extremities appropriately  There was no evidence for injury  Awaiting nurse resuscitators evaluated the  at bedside   Arterial and venous cord blood gases and cord blood was collected for analysis  These were promptly sent to the lab  In the immediate post-partum, 30 units of IV pitocin was administered and the uterus was noted to contract down well with massage and pitocin  The placenta delivered spontaneously at 2800 and was noted to have a centrally inserted 3 vessel cord  The vagina, cervix, and perineum were inspected and there was noted to be no lacerations  At the conclusion of the delivery, all needle, sponge, and instrument counts were noted to be correct  Patient tolerated the procedure well and was allowed to recover in labor and delivery room with family and  before being transferred to the post-partum floor       Genevieve Day MD

## 2022-09-20 NOTE — TELEPHONE ENCOUNTER
Regarding: Labor  ----- Message from [facility] sent at 9/19/2022 11:29 PM EDT -----  "My water just broke "

## 2022-09-20 NOTE — TELEPHONE ENCOUNTER
Reason for Disposition   Leakage of fluid from vagina    Answer Assessment - Initial Assessment Questions  1  ONSET: "When did the symptoms begin?"         15 minutes ago     2  CONTRACTIONS: "Are you having any contractions?" If yes, ask: "Describe the contractions that you are having " (e g , duration, frequency, regularity, severity)      Just started     3  NANCY: "What date are you expecting to deliver?"      9/28/22    4  PARITY: "Have you had a baby before?" If Yes, ask: "How long did the labor last?"      Yes, third baby     5  FETAL MOVEMENT: "Has the baby's movement decreased or changed significantly from normal?"     Yes    6   OTHER SYMPTOMS: "Do you have any other symptoms?" (e g , abdominal pain, vaginal bleeding, fever, hand/facial swelling)      Leakage of fluid    Protocols used: [place] [facility] [place] [facility]

## 2022-09-20 NOTE — ANESTHESIA PREPROCEDURE EVALUATION
Procedure:  LABOR ANALGESIA    Relevant Problems   ANESTHESIA (within normal limits)      GYN   (+) 37 weeks gestation of pregnancy   (+) 38 weeks gestation of pregnancy      NEURO/PSYCH   (+) Generalized anxiety disorder   (+) OCD (obsessive compulsive disorder)      PULMONARY (within normal limits)        Physical Exam    Airway    Mallampati score: II  TM Distance: >3 FB  Neck ROM: full     Dental       Cardiovascular  Cardiovascular exam normal    Pulmonary  Pulmonary exam normal     Other Findings        Anesthesia Plan  ASA Score- 2     Anesthesia Type- epidural with ASA Monitors  Additional Monitors:   Airway Plan:           Plan Factors-    Chart reviewed  Existing labs reviewed  Patient summary reviewed  Induction-     Postoperative Plan-     Informed Consent- Anesthetic plan and risks discussed with patient

## 2022-09-20 NOTE — OB LABOR/OXYTOCIN SAFETY PROGRESS
Labor Progress Note - Renny Kussmaul Sinatore 28 y o  female MRN: [id number]    Unit/Bed#: L&D 320-01 Encounter: [id number]       Contraction Frequency (minutes): 2-4  Contraction Quality: Moderate  Tachysystole: No   Cervical Dilation: 4        Cervical Effacement: 80  Fetal Station: 0  Baseline Rate: 125 bpm  Fetal Heart Rate: 130 BPM  FHR Category: Category I               Vital Signs:   Vitals:    09/20/22 0311   BP: 119/64   Pulse: 76   Resp:    Temp:        Notes/comments:   Pt comfortable with Epidural  Notes intermittent pressure--advised fetal station now 0  --pt refuses insulin          Susy Pineda MD 9/20/2022 3:37 AM

## 2022-09-20 NOTE — NURSING NOTE
Patient had two blood sugars above 100  Per protocol, patient should be on an insulin drip  Risks discussed with patient and patient refuses  Dr Johnny Lopes aware  Will continue to monitor blood sugars q 1 hour until delivery per Dr Johnny Lopes

## 2022-09-20 NOTE — ANESTHESIA POSTPROCEDURE EVALUATION
Post-Op Assessment Note    CV Status:  Stable    Pain management: adequate     Mental Status:  Alert and awake   Hydration Status:  Euvolemic   PONV Controlled:  Controlled   Airway Patency:  Patent      Post Op Vitals Reviewed: Yes      Staff: Anesthesiologist     Post-op block assessment: no complications and catheter intact      No complications documented    /56   Pulse 85   Temp 98 2 °F (36 8 °C) (Oral)   Resp 18   Ht 5' 7" (1 702 m)   Wt 102 kg (225 lb)   LMP 12/22/2021   Breastfeeding Unknown   BMI 35 24 kg/m²   BP      Temp      Pulse     Resp      SpO2

## 2022-09-21 VITALS
SYSTOLIC BLOOD PRESSURE: 134 MMHG | TEMPERATURE: 98.3 F | RESPIRATION RATE: 16 BRPM | DIASTOLIC BLOOD PRESSURE: 83 MMHG | WEIGHT: 225 LBS | BODY MASS INDEX: 35.31 KG/M2 | HEIGHT: 67 IN | HEART RATE: 75 BPM | OXYGEN SATURATION: 98 %

## 2022-09-21 LAB — BACTERIA UR CULT: NORMAL

## 2022-09-21 PROCEDURE — 99024 POSTOP FOLLOW-UP VISIT: CPT | Performed by: OBSTETRICS & GYNECOLOGY

## 2022-09-21 RX ORDER — ACETAMINOPHEN 325 MG/1
650 TABLET ORAL EVERY 4 HOURS PRN
Refills: 0
Start: 2022-09-21

## 2022-09-21 RX ORDER — IBUPROFEN 600 MG/1
600 TABLET ORAL EVERY 6 HOURS
Qty: 30 TABLET | Refills: 0 | Status: SHIPPED | OUTPATIENT
Start: 2022-09-21

## 2022-09-21 RX ADMIN — IBUPROFEN 600 MG: 600 TABLET ORAL at 07:47

## 2022-09-21 RX ADMIN — ESCITALOPRAM OXALATE 10 MG: 10 TABLET ORAL at 00:12

## 2022-09-21 RX ADMIN — IBUPROFEN 600 MG: 600 TABLET ORAL at 00:12

## 2022-09-21 RX ADMIN — DOCUSATE SODIUM 100 MG: 100 CAPSULE, LIQUID FILLED ORAL at 10:21

## 2022-09-21 NOTE — PROGRESS NOTES
Progress Note - OB/GYN   Ovidio Lara 28 y o  female MRN: [id number]  Unit/Bed#: L&D 314-01 Encounter: [id number]    Assessment:  PPD#1 s/p Spontaneous Vaginal Delivery, stable    Plan:    1) GHTN   Normotensive overnight   CMP wnl, P/Cr<0 3    2) Limited PNC    Prenatal labs pend, UDS neg    3) Anxiety   Lexapro ordered    4) Postpartum care  Encourage ambulation   Encourage breastfeeding  Continue current meds   5) Disposition   Anticipate discharge home today if baby cleared    Subjective/Objective     Subjective:     Patient reports feeling well, would like micronor for contraception    Pain: no  Tolerating PO: yes  Voiding: yes  Flatus: yes  BM: no  Ambulating: yes  Breastfeeding: Breastfeeding  Chest pain: no  Shortness of breath: no  Leg pain: no  Lochia: wnl    Objective:     Vitals:  Vitals:    09/20/22 1500 09/20/22 1900 09/20/22 2300 09/21/22 0300   BP: 137/79 134/77 125/65 132/74   BP Location: Right arm Right arm Right arm Right arm   Pulse: 84 81 75 73   Resp: 16 18 18 16   Temp: 98 °F (36 7 °C) 98 1 °F (36 7 °C) 98 1 °F (36 7 °C) 97 9 °F (36 6 °C)   TempSrc: Oral Oral Oral Oral   SpO2: 98%  98%    Weight:       Height:           Physical Exam:   GEN: appears well, alert and oriented x 3, pleasant and cooperative   CV: +S1, +S2, no murmurs/rubs/gallops appreciated  RESP: no labored breathing  ABDOMEN: soft, no tenderness, no distention, Uterine fundus firm and non-tender, -1 cm below the umbilicus   EXTREMITIES: non-tender  NEURO Alert and oriented to person, place, and time         Lab Results   Component Value Date    WBC 6 96 09/20/2022    HGB 11 7 09/20/2022    HCT 34 6 (L) 09/20/2022    MCV 88 09/20/2022     09/20/2022         Kota Berg MD, PGY-2  Obstetrics & Gynecology  09/21/22

## 2022-09-21 NOTE — PROGRESS NOTES
Discharge education performed with patient  Educational packet provided, as well as save your life magnet  Patient verbalized an understanding and was encouraged to continue to ask questions prior to discharge

## 2022-09-21 NOTE — PLAN OF CARE
Problem: POSTPARTUM  Goal: Experiences normal postpartum course  Description: INTERVENTIONS:  - Monitor maternal vital signs  - Assess uterine involution and lochia  2022 by Samantha Coleman RN  Outcome: Progressing  2022 by Samantha Coleman RN  Outcome: Adequate for Discharge  Goal: Appropriate maternal -  bonding  Description: INTERVENTIONS:  - Identify family support  - Assess for appropriate maternal/infant bonding   -Encourage maternal/infant bonding opportunities  - Referral to  or  as needed  2022 by Samantha Coleman RN  Outcome: Progressing  2022 by Samantha Coleman RN  Outcome: Adequate for Discharge  Goal: Establishment of infant feeding pattern  Description: INTERVENTIONS:  - Assess breast/bottle feeding  - Refer to lactation as needed  2022 by Samantha Coleman RN  Outcome: Progressing  2022 by Samantha Coleman RN  Outcome: Adequate for Discharge     Problem: Knowledge Deficit  Goal: Verbalizes understanding of labor plan  Description: Assess patient/family/caregiver's baseline knowledge level and ability to understand information  Provide education via patient/family/caregiver's preferred learning method at appropriate level of understanding  1  Provide teaching at level of understanding  2  Provide teaching via preferred learning method(s)  2022 by Samantha Coleman RN  Outcome: Completed  2022 by Samantha Coleman RN  Outcome: Adequate for Discharge     Problem: Labor & Delivery  Goal: Manages discomfort  Description: Assess and monitor for signs and symptoms of discomfort  Assess patient's pain level regularly and per hospital policy  Administer medications as ordered  Support use of nonpharmacological methods to help control pain such as distraction, imagery, relaxation, and application of heat and cold    Collaborate with interdisciplinary team and patient to determine appropriate pain management plan     1  Include patient in decisions related to comfort  2  Offer non-pharmacological pain management interventions  3  Report ineffective pain management to physician  9/20/2022 2003 by Dianne Low RN  Outcome: Completed  9/20/2022 2003 by Dianne Low RN  Outcome: Adequate for Discharge  Goal: Patient vital signs are stable  Description: 1  Assess vital signs - vaginal delivery    9/20/2022 2003 by Dianne Low RN  Outcome: Completed  9/20/2022 2003 by Dianne Low RN  Outcome: Adequate for Discharge

## 2022-09-22 LAB
DME PARACHUTE DELIVERY DATE ACTUAL: NORMAL
DME PARACHUTE DELIVERY DATE EXPECTED: NORMAL
DME PARACHUTE DELIVERY DATE REQUESTED: NORMAL
DME PARACHUTE ITEM DESCRIPTION: NORMAL
DME PARACHUTE ORDER STATUS: NORMAL
DME PARACHUTE SUPPLIER NAME: NORMAL
DME PARACHUTE SUPPLIER PHONE: NORMAL

## 2022-09-28 LAB — PLACENTA IN STORAGE: NORMAL

## 2023-03-24 ENCOUNTER — TELEMEDICINE (OUTPATIENT)
Dept: PSYCHIATRY | Facility: CLINIC | Age: 33
End: 2023-03-24

## 2023-03-24 DIAGNOSIS — F42.2 MIXED OBSESSIONAL THOUGHTS AND ACTS: Primary | ICD-10-CM

## 2023-03-24 DIAGNOSIS — Z91.199 NO-SHOW FOR APPOINTMENT: ICD-10-CM

## 2023-03-24 DIAGNOSIS — F41.1 GAD (GENERALIZED ANXIETY DISORDER): ICD-10-CM

## 2023-03-24 NOTE — PSYCH
No Call  No Show  No Charge    [address] no showed 03/24/23 appointment , staff called and left message to reschedule appointment     Treatment Plan not completed within required time limits due to:  [address] no show appointment on 03/24/23

## 2023-03-24 NOTE — PSYCH
Virtual Regular Visit    Verification of patient location:    Patient is located in the following state in which I hold an active license { amb virtual patient location:[address]}      Assessment/Plan:    Problem List Items Addressed This Visit        Other    OCD (obsessive compulsive disorder) - Primary   Other Visit Diagnoses     ROBINA (generalized anxiety disorder)              Goals addressed in session: {GOALS:43292}          Reason for visit is   Chief Complaint   Patient presents with   • Follow-up   • Medication Management   • Virtual Regular Visit        Encounter provider Maria M Abdullahi PA-C    Provider located at [address] [address]      Recent Visits  No visits were found meeting these conditions  Showing recent visits within past 7 days and meeting all other requirements  Today's Visits  Date Type Provider Dept   03/24/23 [facility], [address], FIDEL Brennan 72 today's visits and meeting all other requirements  Future Appointments  No visits were found meeting these conditions  Showing future appointments within next 150 days and meeting all other requirements       The patient was identified by name and date of birth  Naima Posada was informed that this is a telemedicine visit and that the visit is being conducted through{AMB VIRTUAL VISIT PKEHJQ:56431}  {Telemedicine confidentiality :90909} {Telemedicine participants:53130}  She acknowledged consent and understanding of privacy and security of the video platform  The patient has agreed to participate and understands they can discontinue the visit at any time  Patient is aware this is a billable service  Subjective  Naima Posada is a 35 y o  female ***         HPI     Past Medical History:   Diagnosis Date   • Anxiety    • Depression    • Gestational diabetes    • Increased nuchal translucency space on fetal ultrasound 5/13/2022    FISH XY; normal 13, 18 & 21   • Varicella        Past Surgical History:   Procedure Laterality Date   • ANTERIOR CRUCIATE LIGAMENT REPAIR Left    • CHOLECYSTECTOMY  2019   • INGUINAL HERNIA REPAIR         Current Outpatient Medications   Medication Sig Dispense Refill   • acetaminophen (TYLENOL) 325 mg tablet Take 2 tablets (650 mg total) by mouth every 4 (four) hours as needed for mild pain  0   • benzocaine-menthol-lanolin-aloe (DERMOPLAST) 20-0 5 % topical spray Apply 1 application topically every 6 (six) hours as needed for irritation  0   • escitalopram (LEXAPRO) 10 mg tablet TAKE 1 TABLET BY MOUTH EVERY DAY 30 tablet 0   • ibuprofen (MOTRIN) 600 mg tablet Take 1 tablet (600 mg total) by mouth every 6 (six) hours 30 tablet 0   • Prenatal Vit-Fe Fumarate-FA (PRENATAL VITAMIN PO) Take by mouth       No current facility-administered medications for this visit  Allergies   Allergen Reactions   • Pollen Extract    • Short Ragweed Pollen Ext    • Sulfa Antibiotics        Review of Systems    Video Exam    There were no vitals filed for this visit      Physical Exam     Visit Time    Visit Start Time: ***  Visit Stop Time: ***  Total Visit Duration: {Psych Total Visit Time:81919}

## 2023-04-24 DIAGNOSIS — F41.1 GAD (GENERALIZED ANXIETY DISORDER): ICD-10-CM

## 2024-07-22 ENCOUNTER — TELEPHONE (OUTPATIENT)
Age: 34
End: 2024-07-22